# Patient Record
Sex: FEMALE | Race: WHITE | NOT HISPANIC OR LATINO | Employment: OTHER
[De-identification: names, ages, dates, MRNs, and addresses within clinical notes are randomized per-mention and may not be internally consistent; named-entity substitution may affect disease eponyms.]

---

## 2017-01-06 ENCOUNTER — RECORDS - HEALTHEAST (OUTPATIENT)
Dept: ADMINISTRATIVE | Facility: OTHER | Age: 62
End: 2017-01-06

## 2017-01-06 ENCOUNTER — RECORDS - HEALTHEAST (OUTPATIENT)
Dept: BONE DENSITY | Facility: CLINIC | Age: 62
End: 2017-01-06

## 2017-01-06 DIAGNOSIS — M81.0 AGE-RELATED OSTEOPOROSIS WITHOUT CURRENT PATHOLOGICAL FRACTURE: ICD-10-CM

## 2017-01-17 ENCOUNTER — COMMUNICATION - HEALTHEAST (OUTPATIENT)
Dept: INTERNAL MEDICINE | Facility: CLINIC | Age: 62
End: 2017-01-17

## 2017-02-09 ENCOUNTER — COMMUNICATION - HEALTHEAST (OUTPATIENT)
Dept: FAMILY MEDICINE | Facility: CLINIC | Age: 62
End: 2017-02-09

## 2017-02-09 ENCOUNTER — AMBULATORY - HEALTHEAST (OUTPATIENT)
Dept: NURSING | Facility: CLINIC | Age: 62
End: 2017-02-09

## 2017-02-09 DIAGNOSIS — E03.9 UNSPECIFIED HYPOTHYROIDISM: ICD-10-CM

## 2017-02-09 DIAGNOSIS — M81.0 OSTEOPOROSIS: ICD-10-CM

## 2017-03-16 ENCOUNTER — OFFICE VISIT - HEALTHEAST (OUTPATIENT)
Dept: FAMILY MEDICINE | Facility: CLINIC | Age: 62
End: 2017-03-16

## 2017-03-16 DIAGNOSIS — M81.0 OSTEOPOROSIS: ICD-10-CM

## 2017-03-16 DIAGNOSIS — E03.9 HYPOTHYROIDISM: ICD-10-CM

## 2017-03-16 DIAGNOSIS — E03.9 UNSPECIFIED HYPOTHYROIDISM: ICD-10-CM

## 2017-03-16 DIAGNOSIS — E78.5 HYPERLIPIDEMIA: ICD-10-CM

## 2017-03-16 LAB
CHOLEST SERPL-MCNC: 237 MG/DL
FASTING STATUS PATIENT QL REPORTED: YES
HDLC SERPL-MCNC: 68 MG/DL
LDLC SERPL CALC-MCNC: 145 MG/DL
TRIGL SERPL-MCNC: 122 MG/DL

## 2017-05-22 ENCOUNTER — COMMUNICATION - HEALTHEAST (OUTPATIENT)
Dept: FAMILY MEDICINE | Facility: CLINIC | Age: 62
End: 2017-05-22

## 2017-05-22 DIAGNOSIS — E03.9 UNSPECIFIED HYPOTHYROIDISM: ICD-10-CM

## 2017-08-18 ENCOUNTER — OFFICE VISIT - HEALTHEAST (OUTPATIENT)
Dept: INTERNAL MEDICINE | Facility: CLINIC | Age: 62
End: 2017-08-18

## 2017-08-18 DIAGNOSIS — E03.9 HYPOTHYROIDISM: ICD-10-CM

## 2017-08-18 DIAGNOSIS — M81.0 OSTEOPOROSIS: ICD-10-CM

## 2017-09-02 ENCOUNTER — COMMUNICATION - HEALTHEAST (OUTPATIENT)
Dept: FAMILY MEDICINE | Facility: CLINIC | Age: 62
End: 2017-09-02

## 2017-09-02 DIAGNOSIS — E03.9 UNSPECIFIED HYPOTHYROIDISM: ICD-10-CM

## 2017-10-04 ENCOUNTER — OFFICE VISIT - HEALTHEAST (OUTPATIENT)
Dept: FAMILY MEDICINE | Facility: CLINIC | Age: 62
End: 2017-10-04

## 2017-10-04 DIAGNOSIS — R42 VERTIGO: ICD-10-CM

## 2017-10-04 DIAGNOSIS — Z23 NEEDS FLU SHOT: ICD-10-CM

## 2017-10-04 DIAGNOSIS — L30.9 ECZEMA, UNSPECIFIED TYPE: ICD-10-CM

## 2017-10-04 DIAGNOSIS — R51.9 PERSISTENT HEADACHES: ICD-10-CM

## 2017-11-01 ENCOUNTER — COMMUNICATION - HEALTHEAST (OUTPATIENT)
Dept: FAMILY MEDICINE | Facility: CLINIC | Age: 62
End: 2017-11-01

## 2017-11-01 DIAGNOSIS — E78.5 HYPERLIPIDEMIA: ICD-10-CM

## 2018-01-23 ENCOUNTER — COMMUNICATION - HEALTHEAST (OUTPATIENT)
Dept: ADMINISTRATIVE | Facility: CLINIC | Age: 63
End: 2018-01-23

## 2018-03-15 ENCOUNTER — COMMUNICATION - HEALTHEAST (OUTPATIENT)
Dept: ADMINISTRATIVE | Facility: CLINIC | Age: 63
End: 2018-03-15

## 2018-06-07 ENCOUNTER — AMBULATORY - HEALTHEAST (OUTPATIENT)
Dept: NURSING | Facility: CLINIC | Age: 63
End: 2018-06-07

## 2018-07-13 ENCOUNTER — OFFICE VISIT - HEALTHEAST (OUTPATIENT)
Dept: FAMILY MEDICINE | Facility: CLINIC | Age: 63
End: 2018-07-13

## 2018-07-13 DIAGNOSIS — R51.9 FREQUENT HEADACHES: ICD-10-CM

## 2018-07-13 DIAGNOSIS — Z12.11 SCREEN FOR COLON CANCER: ICD-10-CM

## 2018-07-13 DIAGNOSIS — N95.1 MENOPAUSAL SYNDROME (HOT FLASHES): ICD-10-CM

## 2018-07-13 DIAGNOSIS — M81.0 OSTEOPOROSIS: ICD-10-CM

## 2018-07-13 DIAGNOSIS — K21.9 GERD (GASTROESOPHAGEAL REFLUX DISEASE): ICD-10-CM

## 2018-07-13 DIAGNOSIS — Z01.419 WELL WOMAN EXAM: ICD-10-CM

## 2018-07-13 DIAGNOSIS — E03.9 HYPOTHYROIDISM: ICD-10-CM

## 2018-07-13 DIAGNOSIS — E78.5 HYPERLIPIDEMIA: ICD-10-CM

## 2018-07-13 DIAGNOSIS — Z12.31 VISIT FOR SCREENING MAMMOGRAM: ICD-10-CM

## 2018-07-13 LAB
ALBUMIN SERPL-MCNC: 4.3 G/DL (ref 3.5–5)
ALP SERPL-CCNC: 41 U/L (ref 45–120)
ALT SERPL W P-5'-P-CCNC: 18 U/L (ref 0–45)
ANION GAP SERPL CALCULATED.3IONS-SCNC: 9 MMOL/L (ref 5–18)
AST SERPL W P-5'-P-CCNC: 24 U/L (ref 0–40)
ATRIAL RATE - MUSE: 69 BPM
BILIRUB SERPL-MCNC: 0.8 MG/DL (ref 0–1)
BUN SERPL-MCNC: 19 MG/DL (ref 8–22)
CALCIUM SERPL-MCNC: 9.4 MG/DL (ref 8.5–10.5)
CHLORIDE BLD-SCNC: 106 MMOL/L (ref 98–107)
CHOLEST SERPL-MCNC: 246 MG/DL
CO2 SERPL-SCNC: 26 MMOL/L (ref 22–31)
CREAT SERPL-MCNC: 0.8 MG/DL (ref 0.6–1.1)
DIASTOLIC BLOOD PRESSURE - MUSE: NORMAL MMHG
FASTING STATUS PATIENT QL REPORTED: YES
GFR SERPL CREATININE-BSD FRML MDRD: >60 ML/MIN/1.73M2
GLUCOSE BLD-MCNC: 96 MG/DL (ref 70–125)
HDLC SERPL-MCNC: 72 MG/DL
INTERPRETATION ECG - MUSE: NORMAL
LDLC SERPL CALC-MCNC: 158 MG/DL
P AXIS - MUSE: 68 DEGREES
POTASSIUM BLD-SCNC: 5 MMOL/L (ref 3.5–5)
PR INTERVAL - MUSE: 138 MS
PROT SERPL-MCNC: 7.4 G/DL (ref 6–8)
QRS DURATION - MUSE: 80 MS
QT - MUSE: 428 MS
QTC - MUSE: 458 MS
R AXIS - MUSE: 18 DEGREES
SODIUM SERPL-SCNC: 141 MMOL/L (ref 136–145)
SYSTOLIC BLOOD PRESSURE - MUSE: NORMAL MMHG
T AXIS - MUSE: -3 DEGREES
TRIGL SERPL-MCNC: 78 MG/DL
TSH SERPL DL<=0.005 MIU/L-ACNC: 0.38 UIU/ML (ref 0.3–5)
VENTRICULAR RATE- MUSE: 69 BPM

## 2018-07-13 ASSESSMENT — MIFFLIN-ST. JEOR: SCORE: 1137.14

## 2018-07-16 LAB
HPV SOURCE: NORMAL
HUMAN PAPILLOMA VIRUS 16 DNA: NEGATIVE
HUMAN PAPILLOMA VIRUS 18 DNA: NEGATIVE
HUMAN PAPILLOMA VIRUS FINAL DIAGNOSIS: NORMAL
HUMAN PAPILLOMA VIRUS OTHER HR: NEGATIVE
SPECIMEN DESCRIPTION: NORMAL

## 2018-07-17 ENCOUNTER — COMMUNICATION - HEALTHEAST (OUTPATIENT)
Dept: FAMILY MEDICINE | Facility: CLINIC | Age: 63
End: 2018-07-17

## 2018-07-23 LAB
BKR LAB AP ABNORMAL BLEEDING: NO
BKR LAB AP BIRTH CONTROL/HORMONES: NORMAL
BKR LAB AP CERVICAL APPEARANCE: NORMAL
BKR LAB AP GYN ADEQUACY: NORMAL
BKR LAB AP GYN INTERPRETATION: NORMAL
BKR LAB AP HPV REFLEX: NORMAL
BKR LAB AP LMP: 52
BKR LAB AP PATIENT STATUS: NORMAL
BKR LAB AP PREVIOUS ABNORMAL: NORMAL
BKR LAB AP PREVIOUS NORMAL: 2012
HIGH RISK?: YES
PATH REPORT.COMMENTS IMP SPEC: NORMAL
RESULT FLAG (HE HISTORICAL CONVERSION): NORMAL

## 2018-08-21 ENCOUNTER — COMMUNICATION - HEALTHEAST (OUTPATIENT)
Dept: FAMILY MEDICINE | Facility: CLINIC | Age: 63
End: 2018-08-21

## 2018-08-21 DIAGNOSIS — E03.9 HYPOTHYROIDISM: ICD-10-CM

## 2018-10-10 ENCOUNTER — COMMUNICATION - HEALTHEAST (OUTPATIENT)
Dept: FAMILY MEDICINE | Facility: CLINIC | Age: 63
End: 2018-10-10

## 2018-10-10 DIAGNOSIS — K21.9 GERD (GASTROESOPHAGEAL REFLUX DISEASE): ICD-10-CM

## 2019-03-21 ENCOUNTER — COMMUNICATION - HEALTHEAST (OUTPATIENT)
Dept: FAMILY MEDICINE | Facility: CLINIC | Age: 64
End: 2019-03-21

## 2019-03-21 DIAGNOSIS — E03.9 HYPOTHYROIDISM: ICD-10-CM

## 2019-05-21 ENCOUNTER — COMMUNICATION - HEALTHEAST (OUTPATIENT)
Dept: ADMINISTRATIVE | Facility: CLINIC | Age: 64
End: 2019-05-21

## 2019-05-21 DIAGNOSIS — M81.0 OSTEOPOROSIS: ICD-10-CM

## 2019-05-23 ENCOUNTER — AMBULATORY - HEALTHEAST (OUTPATIENT)
Dept: SCHEDULING | Facility: CLINIC | Age: 64
End: 2019-05-23

## 2019-05-23 DIAGNOSIS — M81.0 OSTEOPOROSIS: ICD-10-CM

## 2019-06-06 ENCOUNTER — OFFICE VISIT - HEALTHEAST (OUTPATIENT)
Dept: INTERNAL MEDICINE | Facility: CLINIC | Age: 64
End: 2019-06-06

## 2019-06-06 DIAGNOSIS — M81.0 AGE RELATED OSTEOPOROSIS, UNSPECIFIED PATHOLOGICAL FRACTURE PRESENCE: ICD-10-CM

## 2019-06-11 ENCOUNTER — COMMUNICATION - HEALTHEAST (OUTPATIENT)
Dept: SCHEDULING | Facility: CLINIC | Age: 64
End: 2019-06-11

## 2019-06-12 ENCOUNTER — OFFICE VISIT - HEALTHEAST (OUTPATIENT)
Dept: FAMILY MEDICINE | Facility: CLINIC | Age: 64
End: 2019-06-12

## 2019-06-12 DIAGNOSIS — M79.2 NEUROPATHIC PAIN: ICD-10-CM

## 2019-06-12 DIAGNOSIS — R30.0 DYSURIA: ICD-10-CM

## 2019-06-12 DIAGNOSIS — M21.611 BUNION, RIGHT: ICD-10-CM

## 2019-06-12 DIAGNOSIS — R25.2 LEG CRAMPING: ICD-10-CM

## 2019-06-12 LAB
ALBUMIN UR-MCNC: NEGATIVE MG/DL
ANION GAP SERPL CALCULATED.3IONS-SCNC: 8 MMOL/L (ref 5–18)
APPEARANCE UR: CLEAR
BACTERIA #/AREA URNS HPF: ABNORMAL HPF
BILIRUB UR QL STRIP: NEGATIVE
BUN SERPL-MCNC: 18 MG/DL (ref 8–22)
CALCIUM SERPL-MCNC: 9.7 MG/DL (ref 8.5–10.5)
CHLORIDE BLD-SCNC: 103 MMOL/L (ref 98–107)
CO2 SERPL-SCNC: 27 MMOL/L (ref 22–31)
COLOR UR AUTO: YELLOW
CREAT SERPL-MCNC: 0.87 MG/DL (ref 0.6–1.1)
GFR SERPL CREATININE-BSD FRML MDRD: >60 ML/MIN/1.73M2
GLUCOSE BLD-MCNC: 97 MG/DL (ref 70–125)
GLUCOSE UR STRIP-MCNC: NEGATIVE MG/DL
HBA1C MFR BLD: 5.7 % (ref 3.5–6)
HGB UR QL STRIP: ABNORMAL
KETONES UR STRIP-MCNC: NEGATIVE MG/DL
LEUKOCYTE ESTERASE UR QL STRIP: NEGATIVE
MAGNESIUM SERPL-MCNC: 2.3 MG/DL (ref 1.8–2.6)
NITRATE UR QL: NEGATIVE
PH UR STRIP: 6.5 [PH] (ref 5–8)
POTASSIUM BLD-SCNC: 5.2 MMOL/L (ref 3.5–5)
RBC #/AREA URNS AUTO: ABNORMAL HPF
SODIUM SERPL-SCNC: 138 MMOL/L (ref 136–145)
SP GR UR STRIP: 1.01 (ref 1–1.03)
SQUAMOUS #/AREA URNS AUTO: ABNORMAL LPF
UROBILINOGEN UR STRIP-ACNC: ABNORMAL
WBC #/AREA URNS AUTO: ABNORMAL HPF

## 2019-06-12 ASSESSMENT — MIFFLIN-ST. JEOR: SCORE: 1127.62

## 2019-06-13 ENCOUNTER — COMMUNICATION - HEALTHEAST (OUTPATIENT)
Dept: FAMILY MEDICINE | Facility: CLINIC | Age: 64
End: 2019-06-13

## 2019-07-01 ENCOUNTER — COMMUNICATION - HEALTHEAST (OUTPATIENT)
Dept: FAMILY MEDICINE | Facility: CLINIC | Age: 64
End: 2019-07-01

## 2019-07-01 DIAGNOSIS — E03.9 HYPOTHYROIDISM: ICD-10-CM

## 2019-07-26 ENCOUNTER — HOSPITAL ENCOUNTER (OUTPATIENT)
Dept: MAMMOGRAPHY | Facility: CLINIC | Age: 64
Discharge: HOME | End: 2019-07-26
Attending: FAMILY MEDICINE

## 2019-07-26 DIAGNOSIS — Z12.31 VISIT FOR SCREENING MAMMOGRAM: ICD-10-CM

## 2019-07-29 ENCOUNTER — OFFICE VISIT - HEALTHEAST (OUTPATIENT)
Dept: FAMILY MEDICINE | Facility: CLINIC | Age: 64
End: 2019-07-29

## 2019-07-29 DIAGNOSIS — E03.9 HYPOTHYROIDISM, UNSPECIFIED TYPE: ICD-10-CM

## 2019-07-29 DIAGNOSIS — Z11.59 ENCOUNTER FOR HEPATITIS C SCREENING TEST FOR LOW RISK PATIENT: ICD-10-CM

## 2019-07-29 DIAGNOSIS — Z00.01 ENCOUNTER FOR GENERAL ADULT MEDICAL EXAMINATION WITH ABNORMAL FINDINGS: ICD-10-CM

## 2019-07-29 DIAGNOSIS — Z12.11 SPECIAL SCREENING FOR MALIGNANT NEOPLASMS, COLON: ICD-10-CM

## 2019-07-29 DIAGNOSIS — R20.2 PARESTHESIAS: ICD-10-CM

## 2019-07-29 DIAGNOSIS — L30.9 ECZEMA, UNSPECIFIED TYPE: ICD-10-CM

## 2019-07-29 DIAGNOSIS — K21.9 GASTROESOPHAGEAL REFLUX DISEASE WITHOUT ESOPHAGITIS: ICD-10-CM

## 2019-07-29 DIAGNOSIS — E03.9 HYPOTHYROIDISM: ICD-10-CM

## 2019-07-29 DIAGNOSIS — E78.5 HYPERLIPIDEMIA, UNSPECIFIED HYPERLIPIDEMIA TYPE: ICD-10-CM

## 2019-07-29 DIAGNOSIS — M81.0 OSTEOPOROSIS WITHOUT CURRENT PATHOLOGICAL FRACTURE, UNSPECIFIED OSTEOPOROSIS TYPE: ICD-10-CM

## 2019-07-29 LAB
CHOLEST SERPL-MCNC: 257 MG/DL
ERYTHROCYTE [DISTWIDTH] IN BLOOD BY AUTOMATED COUNT: 11.6 % (ref 11–14.5)
FASTING STATUS PATIENT QL REPORTED: ABNORMAL
FERRITIN SERPL-MCNC: 176 NG/ML (ref 10–130)
HCT VFR BLD AUTO: 37.6 % (ref 35–47)
HDLC SERPL-MCNC: 76 MG/DL
HGB BLD-MCNC: 12.7 G/DL (ref 12–16)
LDLC SERPL CALC-MCNC: 166 MG/DL
MCH RBC QN AUTO: 30.8 PG (ref 27–34)
MCHC RBC AUTO-ENTMCNC: 33.8 G/DL (ref 32–36)
MCV RBC AUTO: 91 FL (ref 80–100)
PLATELET # BLD AUTO: 296 THOU/UL (ref 140–440)
PMV BLD AUTO: 7.7 FL (ref 7–10)
RBC # BLD AUTO: 4.13 MILL/UL (ref 3.8–5.4)
T4 FREE SERPL-MCNC: 1.4 NG/DL (ref 0.7–1.8)
TRIGL SERPL-MCNC: 73 MG/DL
TSH SERPL DL<=0.005 MIU/L-ACNC: 0.22 UIU/ML (ref 0.3–5)
VIT B12 SERPL-MCNC: 502 PG/ML (ref 213–816)
WBC: 5.7 THOU/UL (ref 4–11)

## 2019-07-29 ASSESSMENT — MIFFLIN-ST. JEOR: SCORE: 1122.4

## 2019-07-30 LAB
25(OH)D3 SERPL-MCNC: 31 NG/ML (ref 30–80)
25(OH)D3 SERPL-MCNC: 31 NG/ML (ref 30–80)
HCV AB SERPL QL IA: NEGATIVE

## 2019-08-01 LAB — MAGNESIUM SERPL-MCNC: 2 MG/DL (ref 1.8–2.6)

## 2019-10-08 ENCOUNTER — OFFICE VISIT - HEALTHEAST (OUTPATIENT)
Dept: FAMILY MEDICINE | Facility: CLINIC | Age: 64
End: 2019-10-08

## 2019-10-08 DIAGNOSIS — J01.10 ACUTE NON-RECURRENT FRONTAL SINUSITIS: ICD-10-CM

## 2019-12-09 ENCOUNTER — AMBULATORY - HEALTHEAST (OUTPATIENT)
Dept: INTERNAL MEDICINE | Facility: CLINIC | Age: 64
End: 2019-12-09

## 2019-12-09 DIAGNOSIS — M81.0 AGE RELATED OSTEOPOROSIS, UNSPECIFIED PATHOLOGICAL FRACTURE PRESENCE: ICD-10-CM

## 2019-12-11 ENCOUNTER — AMBULATORY - HEALTHEAST (OUTPATIENT)
Dept: NURSING | Facility: CLINIC | Age: 64
End: 2019-12-11

## 2020-02-18 ENCOUNTER — COMMUNICATION - HEALTHEAST (OUTPATIENT)
Dept: FAMILY MEDICINE | Facility: CLINIC | Age: 65
End: 2020-02-18

## 2020-06-16 ENCOUNTER — OFFICE VISIT - HEALTHEAST (OUTPATIENT)
Dept: INTERNAL MEDICINE | Facility: CLINIC | Age: 65
End: 2020-06-16

## 2020-06-16 DIAGNOSIS — M81.0 OSTEOPOROSIS WITHOUT CURRENT PATHOLOGICAL FRACTURE, UNSPECIFIED OSTEOPOROSIS TYPE: ICD-10-CM

## 2020-09-22 ENCOUNTER — COMMUNICATION - HEALTHEAST (OUTPATIENT)
Dept: FAMILY MEDICINE | Facility: CLINIC | Age: 65
End: 2020-09-22

## 2020-09-22 DIAGNOSIS — E03.9 HYPOTHYROIDISM: ICD-10-CM

## 2020-10-07 ENCOUNTER — COMMUNICATION - HEALTHEAST (OUTPATIENT)
Dept: FAMILY MEDICINE | Facility: CLINIC | Age: 65
End: 2020-10-07

## 2020-10-07 DIAGNOSIS — M81.0 OSTEOPOROSIS WITHOUT CURRENT PATHOLOGICAL FRACTURE, UNSPECIFIED OSTEOPOROSIS TYPE: ICD-10-CM

## 2020-10-07 DIAGNOSIS — Z00.01 ENCOUNTER FOR GENERAL ADULT MEDICAL EXAMINATION WITH ABNORMAL FINDINGS: ICD-10-CM

## 2020-10-07 DIAGNOSIS — E78.5 HYPERLIPIDEMIA, UNSPECIFIED HYPERLIPIDEMIA TYPE: ICD-10-CM

## 2020-10-07 DIAGNOSIS — E03.9 HYPOTHYROIDISM, UNSPECIFIED TYPE: ICD-10-CM

## 2020-10-08 ENCOUNTER — COMMUNICATION - HEALTHEAST (OUTPATIENT)
Dept: FAMILY MEDICINE | Facility: CLINIC | Age: 65
End: 2020-10-08

## 2020-10-12 ENCOUNTER — AMBULATORY - HEALTHEAST (OUTPATIENT)
Dept: LAB | Facility: CLINIC | Age: 65
End: 2020-10-12

## 2020-10-12 DIAGNOSIS — M81.0 OSTEOPOROSIS WITHOUT CURRENT PATHOLOGICAL FRACTURE, UNSPECIFIED OSTEOPOROSIS TYPE: ICD-10-CM

## 2020-10-12 DIAGNOSIS — Z00.01 ENCOUNTER FOR GENERAL ADULT MEDICAL EXAMINATION WITH ABNORMAL FINDINGS: ICD-10-CM

## 2020-10-12 DIAGNOSIS — E78.5 HYPERLIPIDEMIA, UNSPECIFIED HYPERLIPIDEMIA TYPE: ICD-10-CM

## 2020-10-12 DIAGNOSIS — E03.9 HYPOTHYROIDISM, UNSPECIFIED TYPE: ICD-10-CM

## 2020-10-12 LAB
ALBUMIN SERPL-MCNC: 4.3 G/DL (ref 3.5–5)
ALP SERPL-CCNC: 34 U/L (ref 45–120)
ALT SERPL W P-5'-P-CCNC: 16 U/L (ref 0–45)
ANION GAP SERPL CALCULATED.3IONS-SCNC: 10 MMOL/L (ref 5–18)
AST SERPL W P-5'-P-CCNC: 21 U/L (ref 0–40)
BILIRUB SERPL-MCNC: 0.7 MG/DL (ref 0–1)
BUN SERPL-MCNC: 21 MG/DL (ref 8–22)
CALCIUM SERPL-MCNC: 9.8 MG/DL (ref 8.5–10.5)
CHLORIDE BLD-SCNC: 102 MMOL/L (ref 98–107)
CHOLEST SERPL-MCNC: 297 MG/DL
CO2 SERPL-SCNC: 26 MMOL/L (ref 22–31)
CREAT SERPL-MCNC: 0.92 MG/DL (ref 0.6–1.1)
FASTING STATUS PATIENT QL REPORTED: YES
GFR SERPL CREATININE-BSD FRML MDRD: >60 ML/MIN/1.73M2
GLUCOSE BLD-MCNC: 95 MG/DL (ref 70–125)
HDLC SERPL-MCNC: 75 MG/DL
LDLC SERPL CALC-MCNC: 199 MG/DL
POTASSIUM BLD-SCNC: 4.2 MMOL/L (ref 3.5–5)
PROT SERPL-MCNC: 7.5 G/DL (ref 6–8)
SODIUM SERPL-SCNC: 138 MMOL/L (ref 136–145)
TRIGL SERPL-MCNC: 113 MG/DL
TSH SERPL DL<=0.005 MIU/L-ACNC: 1.58 UIU/ML (ref 0.3–5)

## 2020-10-13 LAB
25(OH)D3 SERPL-MCNC: 25.7 NG/ML (ref 30–80)
25(OH)D3 SERPL-MCNC: 25.7 NG/ML (ref 30–80)

## 2020-10-15 ENCOUNTER — COMMUNICATION - HEALTHEAST (OUTPATIENT)
Dept: FAMILY MEDICINE | Facility: CLINIC | Age: 65
End: 2020-10-15

## 2020-10-15 DIAGNOSIS — E78.5 HYPERLIPIDEMIA, UNSPECIFIED HYPERLIPIDEMIA TYPE: ICD-10-CM

## 2020-11-04 ENCOUNTER — RECORDS - HEALTHEAST (OUTPATIENT)
Dept: ADMINISTRATIVE | Facility: OTHER | Age: 65
End: 2020-11-04

## 2020-11-30 ENCOUNTER — OFFICE VISIT - HEALTHEAST (OUTPATIENT)
Dept: FAMILY MEDICINE | Facility: CLINIC | Age: 65
End: 2020-11-30

## 2020-11-30 DIAGNOSIS — M81.0 OSTEOPOROSIS WITHOUT CURRENT PATHOLOGICAL FRACTURE, UNSPECIFIED OSTEOPOROSIS TYPE: ICD-10-CM

## 2020-11-30 DIAGNOSIS — Z12.31 ENCOUNTER FOR SCREENING MAMMOGRAM FOR BREAST CANCER: ICD-10-CM

## 2020-11-30 DIAGNOSIS — E78.01 FAMILIAL HYPERCHOLESTEROLEMIA: ICD-10-CM

## 2020-11-30 DIAGNOSIS — Z00.00 WELCOME TO MEDICARE PREVENTIVE VISIT: ICD-10-CM

## 2020-11-30 DIAGNOSIS — E03.9 HYPOTHYROIDISM, UNSPECIFIED TYPE: ICD-10-CM

## 2020-11-30 DIAGNOSIS — G58.8 OTHER MONONEUROPATHY: ICD-10-CM

## 2020-11-30 DIAGNOSIS — B07.9 VIRAL WARTS, UNSPECIFIED TYPE: ICD-10-CM

## 2020-11-30 LAB
IRON SATN MFR SERPL: 23 % (ref 20–50)
IRON SERPL-MCNC: 68 UG/DL (ref 42–175)
TIBC SERPL-MCNC: 295 UG/DL (ref 313–563)
TRANSFERRIN SERPL-MCNC: 236 MG/DL (ref 212–360)
VIT B12 SERPL-MCNC: 602 PG/ML (ref 213–816)

## 2020-11-30 ASSESSMENT — MIFFLIN-ST. JEOR: SCORE: 1139.41

## 2020-12-04 ENCOUNTER — AMBULATORY - HEALTHEAST (OUTPATIENT)
Dept: INTERNAL MEDICINE | Facility: CLINIC | Age: 65
End: 2020-12-04

## 2020-12-04 DIAGNOSIS — M81.0 OSTEOPOROSIS WITHOUT CURRENT PATHOLOGICAL FRACTURE, UNSPECIFIED OSTEOPOROSIS TYPE: ICD-10-CM

## 2020-12-04 DIAGNOSIS — E03.9 HYPOTHYROIDISM, UNSPECIFIED TYPE: ICD-10-CM

## 2020-12-17 ENCOUNTER — AMBULATORY - HEALTHEAST (OUTPATIENT)
Dept: NURSING | Facility: CLINIC | Age: 65
End: 2020-12-17

## 2020-12-17 ENCOUNTER — COMMUNICATION - HEALTHEAST (OUTPATIENT)
Dept: INTERNAL MEDICINE | Facility: CLINIC | Age: 65
End: 2020-12-17

## 2020-12-17 DIAGNOSIS — M81.0 OSTEOPOROSIS WITHOUT CURRENT PATHOLOGICAL FRACTURE, UNSPECIFIED OSTEOPOROSIS TYPE: ICD-10-CM

## 2020-12-18 ENCOUNTER — COMMUNICATION - HEALTHEAST (OUTPATIENT)
Dept: FAMILY MEDICINE | Facility: CLINIC | Age: 65
End: 2020-12-18

## 2020-12-18 DIAGNOSIS — E03.9 HYPOTHYROIDISM: ICD-10-CM

## 2020-12-21 RX ORDER — LEVOTHYROXINE SODIUM 75 UG/1
75 TABLET ORAL DAILY
Qty: 90 TABLET | Refills: 3 | Status: SHIPPED | OUTPATIENT
Start: 2020-12-21 | End: 2021-12-23

## 2021-02-24 ENCOUNTER — COMMUNICATION - HEALTHEAST (OUTPATIENT)
Dept: FAMILY MEDICINE | Facility: CLINIC | Age: 66
End: 2021-02-24

## 2021-02-24 DIAGNOSIS — L30.9 ECZEMA, UNSPECIFIED TYPE: ICD-10-CM

## 2021-02-24 RX ORDER — CLOBETASOL PROPIONATE 0.5 MG/G
1 CREAM TOPICAL 2 TIMES DAILY
Qty: 30 G | Refills: 1 | Status: SHIPPED | OUTPATIENT
Start: 2021-02-24 | End: 2023-06-27

## 2021-03-19 ENCOUNTER — AMBULATORY - HEALTHEAST (OUTPATIENT)
Dept: SCHEDULING | Facility: CLINIC | Age: 66
End: 2021-03-19

## 2021-03-19 DIAGNOSIS — M81.0 OSTEOPOROSIS WITHOUT CURRENT PATHOLOGICAL FRACTURE, UNSPECIFIED OSTEOPOROSIS TYPE: ICD-10-CM

## 2021-04-24 ENCOUNTER — COMMUNICATION - HEALTHEAST (OUTPATIENT)
Dept: FAMILY MEDICINE | Facility: CLINIC | Age: 66
End: 2021-04-24

## 2021-05-11 ENCOUNTER — OFFICE VISIT - HEALTHEAST (OUTPATIENT)
Dept: FAMILY MEDICINE | Facility: CLINIC | Age: 66
End: 2021-05-11

## 2021-05-11 DIAGNOSIS — R82.90 FOUL SMELLING URINE: ICD-10-CM

## 2021-05-11 DIAGNOSIS — B07.9 VIRAL WARTS, UNSPECIFIED TYPE: ICD-10-CM

## 2021-05-11 DIAGNOSIS — M81.0 OSTEOPOROSIS WITHOUT CURRENT PATHOLOGICAL FRACTURE, UNSPECIFIED OSTEOPOROSIS TYPE: ICD-10-CM

## 2021-05-11 DIAGNOSIS — R21 RASH AND NONSPECIFIC SKIN ERUPTION: ICD-10-CM

## 2021-05-11 DIAGNOSIS — E78.01 FAMILIAL HYPERCHOLESTEROLEMIA: ICD-10-CM

## 2021-05-11 DIAGNOSIS — E78.5 HYPERLIPIDEMIA, UNSPECIFIED HYPERLIPIDEMIA TYPE: ICD-10-CM

## 2021-05-11 DIAGNOSIS — G25.81 RESTLESS LEG SYNDROME: ICD-10-CM

## 2021-05-11 LAB
ALBUMIN SERPL-MCNC: 4.2 G/DL (ref 3.5–5)
ALBUMIN UR-MCNC: NEGATIVE G/DL
ALP SERPL-CCNC: 33 U/L (ref 45–120)
ALT SERPL W P-5'-P-CCNC: 20 U/L (ref 0–45)
APPEARANCE UR: CLEAR
AST SERPL W P-5'-P-CCNC: 25 U/L (ref 0–40)
BACTERIA #/AREA URNS HPF: ABNORMAL /[HPF]
BILIRUB DIRECT SERPL-MCNC: 0.3 MG/DL
BILIRUB SERPL-MCNC: 0.7 MG/DL (ref 0–1)
BILIRUB UR QL STRIP: NEGATIVE
CHOLEST SERPL-MCNC: 211 MG/DL
COLOR UR AUTO: YELLOW
FASTING STATUS PATIENT QL REPORTED: YES
GLUCOSE UR STRIP-MCNC: NEGATIVE MG/DL
HDLC SERPL-MCNC: 73 MG/DL
HGB UR QL STRIP: ABNORMAL
KETONES UR STRIP-MCNC: NEGATIVE MG/DL
LDLC SERPL CALC-MCNC: 121 MG/DL
LEUKOCYTE ESTERASE UR QL STRIP: NEGATIVE
NITRATE UR QL: NEGATIVE
PH UR STRIP: 5 [PH] (ref 5–8)
PROT SERPL-MCNC: 7.4 G/DL (ref 6–8)
RBC #/AREA URNS AUTO: ABNORMAL HPF
SP GR UR STRIP: 1.02 (ref 1–1.03)
SQUAMOUS #/AREA URNS AUTO: ABNORMAL LPF
TRIGL SERPL-MCNC: 87 MG/DL
UROBILINOGEN UR STRIP-ACNC: ABNORMAL
WBC #/AREA URNS AUTO: ABNORMAL HPF

## 2021-05-12 RX ORDER — ROSUVASTATIN CALCIUM 5 MG/1
5 TABLET, COATED ORAL AT BEDTIME
Qty: 90 TABLET | Refills: 4 | Status: SHIPPED | OUTPATIENT
Start: 2021-05-12 | End: 2022-06-05

## 2021-05-25 ENCOUNTER — RECORDS - HEALTHEAST (OUTPATIENT)
Dept: ADMINISTRATIVE | Facility: CLINIC | Age: 66
End: 2021-05-25

## 2021-05-26 NOTE — TELEPHONE ENCOUNTER
Refill Approved    Rx renewed per Medication Renewal Policy. Medication was last renewed on 8/21/18.    Ov: 7/13/18    Rhoda Hebert, [facility] Triage/Med Refill 3/23/2019     Requested Prescriptions   Pending Prescriptions Disp Refills     levothyroxine (SYNTHROID, LEVOTHROID) 75 MCG tablet [Pharmacy Med Name: LEVOTHYROXINE 0.075MG (75MCG) TABS] 90 tablet 0     Sig: TAKE 1 TABLET BY MOUTH DAILY    Thyroid Hormones Protocol Passed - 3/21/2019  3:27 PM       Passed - Provider visit in past 12 months or next 3 months    Last office visit with prescriber/PCP: 3/16/2017 Leona He DO OR same dept: Visit date not found OR same specialty: 10/4/2017 Geeta Ashley MD  Last physical: 7/13/2018 Last MTM visit: Visit date not found   Next visit within 3 mo: Visit date not found  Next physical within 3 mo: Visit date not found  Prescriber OR PCP: Leona He DO  Last diagnosis associated with med order: 1. Hypothyroidism  - levothyroxine (SYNTHROID, LEVOTHROID) 75 MCG tablet [Pharmacy Med Name: LEVOTHYROXINE 0.075MG (75MCG) TABS]; TAKE 1 TABLET BY MOUTH DAILY  Dispense: 90 tablet; Refill: 0    If protocol passes may refill for 12 months if within 3 months of last provider visit (or a total of 15 months).            Passed - TSH on file in past 12 months for patient age 12 & older    TSH   Date Value Ref Range Status   07/13/2018 0.38 0.30 - 5.00 uIU/mL Final

## 2021-05-27 ENCOUNTER — RECORDS - HEALTHEAST (OUTPATIENT)
Dept: ADMINISTRATIVE | Facility: CLINIC | Age: 66
End: 2021-05-27

## 2021-05-27 VITALS
OXYGEN SATURATION: 97 % | WEIGHT: 129.4 LBS | DIASTOLIC BLOOD PRESSURE: 71 MMHG | SYSTOLIC BLOOD PRESSURE: 112 MMHG | RESPIRATION RATE: 20 BRPM | TEMPERATURE: 98.3 F | HEART RATE: 76 BPM

## 2021-05-29 NOTE — TELEPHONE ENCOUNTER
Spoke to patient regarding lab results. Recommend avoiding or eliminating refined carbs, sugars, foods with high fructose corn syrup. Recheck Hgb A1c in 6-12 months.     Can place a referral for neurology as needed for neuropathic pain in legs if no improvement.

## 2021-05-29 NOTE — TELEPHONE ENCOUNTER
Patient calling. She is asking for an appointment for UTI.  She reports her urine is cloudy, and it has a very strong smell.  She also admits that she sometimes has a burning sensation.  She denies fever, urgency and frequency.  She reports , she rarely has bladder infection.    An appointment was made for tomorrow at   10:40 am. At the [facility].    Kathleen Holman RN  [facility] Triage/refill nurse    Reason for Disposition    Age > 50 years    Protocols used: URINATION PAIN - FEMALE-A-AH

## 2021-05-29 NOTE — PROGRESS NOTES
1. Age related osteoporosis, unspecified pathological fracture presence       Patient was educated on safety of Prolia utilizing Patient Counseling Chart for Healthcare Providers, as outlined by the Prolia REMS progam.     Return in about 6 months (around 12/6/2019) for Recheck, osteoporosis, Prolia injection.    Patient Instructions   Prolia 6th today.  Prolia 7th in 6 months with my nurse. I will see you in 1 year.    DXA  - in 2 years .   Phone number to schedule [phone].    Daily calcium need is 0890-5891 mg a day from the diet and supplements.  Calcium citrate is easier to digest.  Vitamin D 2000 IU daily recommended.      Chief Complaint   Patient presents with     Osteoporosis Follow Up     Osteo F/U       /65   Pulse 78   Wt 130 lb 8 oz (59.2 kg)   SpO2 98%   BMI 22.05 kg/m        Did you experience any problems with previous Prolia injection? no  Any medication change in the last 6 months? no  Did you take prednisone or other immunosupressant drugs in the last 6 months   (chemo, transplant, rheum, dermatology conditions)? no  Did you have any serious infection in the last 6 months?no  Any recent hospitalizations?no  Do you plan any dental work in the next 2-3 months?no  How much calcium do you take daily from the diet and supplements?1200 mg  How much vit D do you take daily? 2000 IU  Last DXA? 6/2019, Reviewed and discussed      Patient is here today for the 6th Prolia injection. Patient tolerated previous injections well, but for the last 2 years was getting only one dose a year. That is the explanation for the decline in the spine bone density.  We discussed calcium and vit D daily needs today.   Next Prolia injection will be in 6 months.     15 minutes spent with the patient and more then 50 % of the time in counseling.  This note has been dictated using voice recognition software. Any grammatical or context distortions are unintentional and inherent to the software      Patient Active  Problem List   Diagnosis     Hypothyroidism     Osteoporosis     Hyperlipidemia     GERD (gastroesophageal reflux disease)     Frequent headaches       Current Outpatient Medications on File Prior to Visit   Medication Sig Dispense Refill     cholecalciferol, vitamin D3, 5,000 unit Tab Take 1 tablet by mouth daily.       levothyroxine (SYNTHROID, LEVOTHROID) 75 MCG tablet TAKE 1 TABLET BY MOUTH DAILY 90 tablet 0     multivitamin with minerals (THERA-M) 9 mg iron-400 mcg Tab tablet Take 1 tablet by mouth daily.       omeprazole (PRILOSEC) 20 MG capsule TAKE 1 CAPSULE(20 MG) BY MOUTH DAILY BEFORE BREAKFAST 90 capsule 3     simvastatin (ZOCOR) 20 MG tablet Take 1 tablet (20 mg total) by mouth every evening. 90 tablet 4     clobetasol (TEMOVATE) 0.05 % cream Apply 1 application topically 2 (two) times a day. 30 g 1     Current Facility-Administered Medications on File Prior to Visit   Medication Dose Route Frequency Provider Last Rate Last Dose     denosumab 60 mg (PROLIA 60 mg/ml)  60 mg Subcutaneous Q6 Months Adeline Mahoney MD   60 mg at 06/07/18 2199

## 2021-05-29 NOTE — PATIENT INSTRUCTIONS - HE
Prolia 6th today.  Prolia 7th in 6 months with my nurse. I will see you in 1 year.    DXA  - in 2 years .   Phone number to schedule [phone].    Daily calcium need is 8549-6528 mg a day from the diet and supplements.  Calcium citrate is easier to digest.  Vitamin D 2000 IU daily recommended.

## 2021-05-29 NOTE — PROGRESS NOTES
Assessment/Plan:  1. Dysuria  - Urinalysis-UC if Indicated  -viral in nature vs bacterial UTI    2. Leg cramping  - Basic Metabolic Panel  - Magnesium    3. Bunion, right  - Ambulatory referral to Podiatry    4. Neuropathic pain  - Glycosylated Hemoglobin A1C  -consider neurology referral if no other causes were found    Follow up in 2 weeks with PCP for further evaluation.       Subjective:  Lynette Tai is a 63 y.o. year old female who has symptoms of urinary dysuria, flank pain bilaterally and foul smelling urine, incontinence for 1 month.  Denies fevers, chills, body aches.  Home treatment of symptoms includes drinking lots of water.     Patient has many other complaints including restless legs at nighttime, cramps, neuropathic sensation on bilateral lateral lower legs, bilateral bunions. Patient states she has recently taken sugar out of her diet. Concerns for family history of diabetes.     ROS  General: Denies fevers, chills, body aches, unintentional weight loss  Abdominal:Denies nausea, vomiting, diarrhea, constipation, abdominal pain  Back: Denies pain.  Extremities: has a burning sensation in bilateral lateral lower legs that is intermittent, restless legs, bilateral bunions  Social History     Tobacco Use   Smoking Status Never Smoker   Smokeless Tobacco Never Used     Patient Active Problem List   Diagnosis     Hypothyroidism     Osteoporosis     Hyperlipidemia     GERD (gastroesophageal reflux disease)     Frequent headaches     Past Medical History:   Diagnosis Date     Intermittent asthma      Intermittent asthma      Osteoporosis     a     Current Outpatient Medications on File Prior to Visit   Medication Sig Dispense Refill     cholecalciferol, vitamin D3, 5,000 unit Tab Take 1 tablet by mouth daily.       levothyroxine (SYNTHROID, LEVOTHROID) 75 MCG tablet TAKE 1 TABLET BY MOUTH DAILY 90 tablet 0     multivitamin with minerals (THERA-M) 9 mg iron-400 mcg Tab tablet Take 1 tablet by mouth  "daily.       omeprazole (PRILOSEC) 20 MG capsule TAKE 1 CAPSULE(20 MG) BY MOUTH DAILY BEFORE BREAKFAST 90 capsule 3     simvastatin (ZOCOR) 20 MG tablet Take 1 tablet (20 mg total) by mouth every evening. 90 tablet 4     clobetasol (TEMOVATE) 0.05 % cream Apply 1 application topically 2 (two) times a day. 30 g 1     Current Facility-Administered Medications on File Prior to Visit   Medication Dose Route Frequency Provider Last Rate Last Dose     denosumab 60 mg (PROLIA 60 mg/ml)  60 mg Subcutaneous Q6 Months Adeline Mahoney MD   60 mg at 06/06/19 1418     Allergies   Allergen Reactions     Codeine      Nausea      Hydrocodone-Acetaminophen      nausesa      Other Environmental Allergy      Sulfa (Sulfonamide Antibiotics)      Itchy eyes        Objective: /72 (Patient Site: Left Arm, Patient Position: Sitting, Cuff Size: Adult Regular)   Pulse 85   Ht 5' 4.5\" (1.638 m)   Wt 130 lb (59 kg)   SpO2 96%   BMI 21.97 kg/m    General: Patient appears to be in no acute distress.  Ears: No nodules, lesions, masses, discharge or tenderness in auricles or mastoid area. No cerumen in ear canals. Tympanic membranes pearly gray, normal bony landmarks and cone of light bilaterally, no bulges or perforations.   Oropharynx: Buccal mucosa pink, moist, no lesions. Tongue midline, spongy, pink. Uvula midline. Pharynx with out erythema, no exudates.  Lungs: Unlabored. clear breath sounds heard throughout lung fields.   Heart: Regular rate and rhythm.  Abdomen: Soft rounded abdomen, no surgical scars. Bowel sounds heard in all four quadrants; liver, spleen, and kidney not palpable, no tenderness on palpation of abdomen, no CVA tenderness.    Extremities: normal strength in bilateral lower extremities.    Susannah Phillips, APRN, CNP      "

## 2021-05-30 VITALS — BODY MASS INDEX: 21.8 KG/M2 | WEIGHT: 128 LBS

## 2021-05-30 NOTE — PROGRESS NOTES
FEMALE ADULT PREVENTIVE EXAM    ASSESSMENT & PLAN  Lynette was seen today for annual exam, pruritus, osteoporosis and leg problem.    Diagnoses and all orders for this visit:    Encounter for general adult medical examination with abnormal findings-health maintenance updated.  See below for instructions on follow-up    Osteoporosis without current pathological fracture, unspecified osteoporosis type-patient is going to continue doing her Prolia injections at the [facility] and we discussed follow-up with provider in 1 year.  She will go every 6 months and if she is considering a referral elsewhere she will let me know  -     Vitamin D, Total (25-Hydroxy)    Paresthesias-we will check the following labs to ensure no iron deficiency anemia or B12 deficiency or magnesium deficiency causing symptoms.  I get the suspicion that it could be secondary to her simvastatin which we went up on last year.  I am okay with her considering a trial off the medication if she chooses to see if symptoms resolve.  Could also consider gabapentin.  Uncertain the symptoms are a restless leg type phenomena  -     HM2(CBC w/o Differential)  -     Ferritin  -     Vitamin B12  -     Magnesium    Hypothyroidism, unspecified type-no symptoms of over or underactive thyroid function.  Continue current medication regimen as patient is just below threshold for TSH  -     Thyroid Cascade  -     T4, Free    Hyperlipidemia, unspecified hyperlipidemia type-discussed with patient holding her simvastatin to see if paresthesias resolve.  If they do or do not she can change to rosuvastatin given elevated LDL levels in 1 month or so and continue with better tolerated and repeat labs next year.  Otherwise she is low risk other than family history and we discussed she could consider staying off statin therapy if she chose as well for primary prevention  -     Lipid Profile  -     rosuvastatin (CRESTOR) 10 MG tablet; Take 1 tablet (10 mg total) by  "mouth at bedtime. For cholesterol    Gastroesophageal reflux disease without esophagitis-stable and only taking omeprazole as needed    Encounter for hepatitis C screening test for low risk patient  -     Hepatitis C Antibody (Anti-HCV)    Special screening for malignant neoplasms, colon-brother has a history of polyps and she is on a 5-year cycle.  We discussed Cologuard but she thinks she will just move forward with a colonoscopy  -     Ambulatory referral for Colonoscopy    Eczema, unspecified type-hyperpigmentation along the left side of her back that supposedly was biopsied.  We will retrial the clobetasol again.  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  She might consider going back to that dermatologist again  -     clobetasol (TEMOVATE) 0.05 % cream; Apply 1 application topically 2 (two) times a day.    Hypothyroidism  -     levothyroxine (SYNTHROID, LEVOTHROID) 75 MCG tablet; Take 1 tablet (75 mcg total) by mouth daily.    Other orders  -     Td, Preservative Free (green label)      Patient instructions:   -for the numbness in your legs: will check iron levels and thyroid and B12  -if all those are normal then magnesium 400mg daily. If no improvement then,   - we could do a trial of holding your simvastatin for 1-2 months and see if symptoms resolve. If they do then I would either say- dont go back on medication and recheck levels in 3-4 months < 190 LDL  dont go back on meds   OR if you dont feel comfortable because of family history then we could just put you back on rosuvastatin \"crestor\" better tolerated for cramps.   -consider other work up for nerve and blood flow.       -continue the prolia for now and dexa scan in 2 years.  Ill check vitamin D today  . Cancel appt with visekruna and keep shot appt.     -consider weaning or reducing omeprazole and take as needed. Might want to consider weaning first to every other day, then every 3 days. If rebound symptoms then start zantac once to twice daily to " bridge for 2 weeks as you wean.    -check with your insurance on shingrix.     -consider advance care directive     -can consider topical estrogen cream for itching and dryness     CHIEF COMPLAINT:  Female preventive exam.    SUBJECTIVE:  Lynette Tai is a 64 y.o. female presents to the clinic today for a physical exam along with Prolia shot, leg problem, hyperlipidemia, puntius, acid reflux, and health maintenance.    The patient has been taking care of her  because he has his knee replaced at the beginning of May and he has recovered well. He has a golf tournament this weekend.     Prolia shot: Patient has osteoporosis and high fracture risk, reportedly receiving Prolia therapy. there has been a  -6.9 % change in the bone density of the spine on 5/21/2019. She had a delay for an injection because she had a dental implant. She wonders if she can do the prolia injections somewhere else because she was concerned that there was some miscommunication of how many injections that she needed to get.  She is under the impression she was not supposed to receive more than 2 and now is due for her seventh injection.  She is okay with continuing at that clinic when things are clarified    Leg problem: Patient has a burning tingliness in both legs at night for a month. She describes it as if you were sunburned and your skin is tight. She takes her cholesterol medication at night. She stopped taking the ZOCOR not more than a week. She does not think her symptoms improved.  Dose was increased to last year    Hyperlipidemia: Patient had a toast and coffee at 6:15 AM. Patient's last cholesterol was 246 on 7/13/18.     Eczema patient went to a Dermatologist and they did a scrape and she was diagnosed with Pickers Disease. It was never a dry patch or an outbreak when she scratched it. She applied the cream on it twice a day. The patient has an underline itch. If she touches it, then she would want to scratch it. She  had residual itching from where the mole was removed by the dermatologist. She had darker pigment appear prior to the itching.     Acid reflux: Patient's acid reflux is well controlled with the omeprazole. She does not wake up as much anymore. She has been sleeping well lately.  She does not take it daily anymore only as needed and she knows what her food triggers are    Health Maintenance: She will continue the prolia and DXA scan for 2 years. She is going to do a tetanus shot and Hep C screening today. She is checking her insurance with the shingrix and is told the risks and benefits of it. Her brother had Polyps and the patient had a colonoscopy back in 2013. The patient does not have an advance care directive.     She  has a past medical history of Intermittent asthma, Intermittent asthma, and Osteoporosis.    Lab Results   Component Value Date    WBC 5.7 07/29/2019    HGB 12.7 07/29/2019    HCT 37.6 07/29/2019    MCV 91 07/29/2019     07/29/2019     06/12/2019    K 5.2 (H) 06/12/2019    BUN 18 06/12/2019     Lab Results   Component Value Date    CHOL 257 (H) 07/29/2019    HDL 76 07/29/2019    LDLCALC 166 (H) 07/29/2019    TRIG 73 07/29/2019     Lab Results   Component Value Date    TSH 0.22 (L) 07/29/2019     BP Readings from Last 3 Encounters:   07/29/19 104/60   06/12/19 106/72   06/06/19 104/65       Surgeries:    Past Surgical History:   Procedure Laterality Date     appendix      10/1972     CHOLECYSTECTOMY      5/1986     OVARIAN CYST REMOVAL      10/1972     MS OSTEOTOMY CLAVICLE      Description: Osteotomy Of The Clavicle;  Recorded: 02/22/2012;       Family History:  Her family history includes Breast cancer in her mother; Coronary artery disease in her father and mother; Lung cancer in her sister; Osteoporosis in her mother; Prostate cancer in her father; Sleep apnea in her brother.    Social History:  She  reports that she has never smoked. She has never used smokeless tobacco. She  reports that she drinks alcohol.    Medications:    Current Outpatient Medications:      cholecalciferol, vitamin D3, 5,000 unit Tab, Take 1 tablet by mouth daily., Disp: , Rfl:      levothyroxine (SYNTHROID, LEVOTHROID) 75 MCG tablet, Take 1 tablet (75 mcg total) by mouth daily., Disp: 90 tablet, Rfl: 4     multivitamin with minerals (THERA-M) 9 mg iron-400 mcg Tab tablet, Take 1 tablet by mouth daily., Disp: , Rfl:      omeprazole (PRILOSEC) 20 MG capsule, TAKE 1 CAPSULE(20 MG) BY MOUTH DAILY BEFORE BREAKFAST, Disp: 90 capsule, Rfl: 3     clobetasol (TEMOVATE) 0.05 % cream, Apply 1 application topically 2 (two) times a day., Disp: 30 g, Rfl: 1     rosuvastatin (CRESTOR) 10 MG tablet, Take 1 tablet (10 mg total) by mouth at bedtime. For cholesterol, Disp: 90 tablet, Rfl: 4    Current Facility-Administered Medications:      denosumab 60 mg (PROLIA 60 mg/ml), 60 mg, Subcutaneous, Q6 Months, Adeline Mahoney MD, 60 mg at 06/06/19 1418  HELD MEDICATIONS: None.    Allergies:  No latex allergies.  Allergies   Allergen Reactions     Codeine      Nausea      Hydrocodone-Acetaminophen      nausesa      Other Environmental Allergy      Sulfa (Sulfonamide Antibiotics)      Itchy eyes        RISK BEHAVIOR & HEALTH HABITS  Regular Exercise: Patient exercises 4-5 days a week by biking and walking. Stretches a lot after workouts.   Calcium intake/Osteoporosis prevention: Prolia injection therapy. She is taking calcium with magnesium.     Guns: NO  Sun Screen: YES  Dental Care: YES    REVIEW OF SYSTEMS:  Complete head to toe review of systems is otherwise negative except as above.    OBJECTIVE:  VITAL SIGNS:    Vitals:    07/29/19 1416   BP: 104/60   Pulse: 68   Resp: 18   Temp: 97.2  F (36.2  C)   SpO2: 98%     GENERAL:  Patient alert, in no acute distress.  EYES: PERRLA. Extraoccular movements intact, pupils equal, reactive to light and accommodation.  Normal conjunctiva and lids.  Undilated fudoscopic exam normal,  including normal size, appearance cup-to-disc ratio.  Normal posterior segments, including no exudates or hemorrhages.  ENT:  Hearing grossly normal.  Normal appearance to ears and nose.  Bilateral TM s, external canals, oropharynx normal. Normal lips, gums and teeth.  Normal nasal mucosa, septum and turbinates.  NECK:  Supple, without thyromegaly or mass.  RESP:  Clear to auscultation without crackles, wheezes or distress.  Normal respiratory effort.   CV:  Regular rate and rhythm without murmurs, rubs or gallops.  Normal carotid, abdominal aorta, femoral and pedal pulses.  No varicosities or edema.  ABDOMEN:  Soft, non-tender, without hepatosplenomegaly, masses, or hernias.   BREASTS:  Nontender, without masses, nipple discharge, erythema, or axillary adenopathy.    : Normal bimanual exam lYMPHATIC: Normal palpation of neck, groin and axilla..  No lymphadenopathy.  No bruising.  NEURO:  CN II-XII intact, motor & sensory function all intact.  DTR and reflexes normal.  PSYCHIATRIC:  Alert & oriented with normal mood and affect.  Good judgment and insight.  SKIN: Hyperpigmentation paraspinal muscles on the left mid back  MUSCULOSKELETAL: Normal gait and station.  - Spine / Ribs / Pelvis: Normal inspection, ROM, stability and strength: Spine, Head, Neck, Upper and Lower Extremities.    General  Immunizations reviewed and updated .  Recommended adequate calcium intake/osteoporosis prevention.  Discussed colon cancer screening at age 50, 45 if -American.  Diet and exercise reviewed, including goal of aerobic exercise 30-90 minutes most days of the week, moderation of portions sizes, avoiding eating out and fast food and increase in fruits and vegetables.    ADDITIONAL HISTORY SUMMARIZED (2): Reviewed office note with Dr. Michael regarding Prolia injection.  Reviewed colonoscopy.  DECISION TO OBTAIN EXTRA INFORMATION (1): None.   RADIOLOGY TESTS (1): Reviewed DXA, 5/21/19.  Reviewed mammogram.  LABS (1):  Ordered labs today.   MEDICINE TESTS (1): None.  INDEPENDENT REVIEW (2 each): None.     The visit lasted a total of 46 minutes face to face with the patient. Over 50% of the time was spent counseling and educating the patient about Prolia shot, leg problem, hyperlipidemia, puntius, acid reflux, and health maintenance.    ILori, am scribing for and in the presence of, Dr. He.    I, Dr. He, personally performed the services described in this documentation, as scribed by Lori Henriquez in my presence, and it is both accurate and complete.    Total Data Points: 4

## 2021-05-30 NOTE — PATIENT INSTRUCTIONS - HE
"-for the numbness in your legs: will check iron levels and thyroid and B12  -if all those are normal then magnesium 400mg daily. If no improvement then,   - we could do a trial of holding your simvastatin for 1-2 months and see if symptoms resolve. If they do then I would either say- dont go back on medication and recheck levels in 3-4 months < 190 LDL  dont go back on meds   OR if you dont feel comfortable because of family history then we could just put you back on rosuvastatin \"crestor\" better tolerated for cramps.   -consider other work up for nerve and blood flow.       -continue the prolia for now and dexa scan in 2 years.  Ill check vitamin D today  . Cancel appt with visekruna and keep shot appt.     -consider weaning or reducing omeprazole and take as needed. Might want to consider weaning first to every other day, then every 3 days. If rebound symptoms then start zantac once to twice daily to bridge for 2 weeks as you wean.    -check with your insurance on shingrix.     -consider advance care directive     -can consider topical estrogen cream for itching and dryness   "

## 2021-05-30 NOTE — TELEPHONE ENCOUNTER
Due for labs    Rx renewed per Medication Renewal Policy. Medication was last renewed on 3/23/19.    Marissa Jacobs, [facility] Triage/Med Refill 7/2/2019     Requested Prescriptions   Pending Prescriptions Disp Refills     levothyroxine (SYNTHROID, LEVOTHROID) 75 MCG tablet [Pharmacy Med Name: LEVOTHYROXINE 0.075MG (75MCG) TABS] 90 tablet 0     Sig: TAKE 1 TABLET BY MOUTH EVERY DAY       Thyroid Hormones Protocol Passed - 7/1/2019  4:22 PM        Passed - Provider visit in past 12 months or next 3 months     Last office visit with prescriber/PCP: 3/16/2017 Leona He DO OR same dept: 6/12/2019 Susannah Vargas CNP OR same specialty: 6/12/2019 Susannah Vargas CNP  Last physical: 7/13/2018 Last MTM visit: Visit date not found   Next visit within 3 mo: Visit date not found  Next physical within 3 mo: Visit date not found  Prescriber OR PCP: Leona He DO  Last diagnosis associated with med order: 1. Hypothyroidism  - levothyroxine (SYNTHROID, LEVOTHROID) 75 MCG tablet [Pharmacy Med Name: LEVOTHYROXINE 0.075MG (75MCG) TABS]; TAKE 1 TABLET BY MOUTH EVERY DAY  Dispense: 90 tablet; Refill: 0    If protocol passes may refill for 12 months if within 3 months of last provider visit (or a total of 15 months).             Passed - TSH on file in past 12 months for patient age 12 & older     TSH   Date Value Ref Range Status   07/13/2018 0.38 0.30 - 5.00 uIU/mL Final

## 2021-05-31 VITALS — WEIGHT: 126 LBS | BODY MASS INDEX: 21.46 KG/M2

## 2021-05-31 VITALS — BODY MASS INDEX: 22.57 KG/M2 | WEIGHT: 132.5 LBS

## 2021-06-01 ENCOUNTER — RECORDS - HEALTHEAST (OUTPATIENT)
Dept: ADMINISTRATIVE | Facility: CLINIC | Age: 66
End: 2021-06-01

## 2021-06-01 VITALS — HEIGHT: 65 IN | BODY MASS INDEX: 22.01 KG/M2 | WEIGHT: 132.1 LBS

## 2021-06-02 NOTE — PROGRESS NOTES
Assessment:       1. Acute non-recurrent frontal sinusitis  doxycycline (VIBRA-TABS) 100 MG tablet     Medical Decision Making  Patient presents with sinus congestion and facial pressure concerning for bacterial sinusitis.  Symptoms have been present for over 1 week with subjective fevers and chills.      Plan:       Oral antibiotics per orders.  Trial of nasal steroids and over-the-counter decongestants discussed.  Recommended plenty of fluids.  Discussed signs of worsening symptoms and when to follow-up with PCP if no symptom improvement.      Patient Instructions   You were seen today for a sinus infection.    Symptoms management:  - May use Tylenol or Ibuprofen for discomfort and/or fever if present  - May try saline irrigation to relieve congestion (see instructions below)  - Use of nasal steroids (Flonase) as prescribed  - Take antibiotics as prescribed for the full course (even if symptoms have improved)  - If you are experiencing ear fullness, may try an oral decongestant such as sudafed    Reasons to come back for re-evaluation:  - Develop a fever of 100.4F or current fever worsens  - Troubles seeing or double vision  - Swelling or redness around one or both eyes  - Sfiff neck  - Symptoms are not improving over the next 5 days    Buffered normal saline nasal irrigation   The benefits   1. Saline (saltwater) washes the mucus and irritants from your nose.   2. The sinus passages are moisturized.   3. Studies have also shown that a nasal irrigation improves cell function (the cells that move the mucus work better).   The recipe   Use a one-quart glass jar that is thoroughly cleansed.   You may use a large medical syringe (30 cc), water pick with an irrigation tip (preferred method), squeeze bottle, or Neti pot. Do not use a baby bulb syringe. The syringe or pick should be sterilized frequently or replaced every two to three weeks to avoid contamination and infection.   Fill with water that has been  distilled, previously boiled, or otherwise sterilized. Plain tap water is not recommended, because it is not necessarily sterile.   Add 1 to 1  heaping teaspoons of pickling/markie salt. Do not use table salt, because it contains a large number of additives.   Add 1 teaspoon of baking soda (pure bicarbonate).   Mix ingredients together, and store at room temperature. Discard after one week.   You may also make up a solution from premixed packets that are commercially prepared specifically for nasal irrigation.   The instructions   Irrigate your nose with saline one to two times per day.   If you have been told to use nasal medication, you should always use your saline solution first. The nasal medication is much more effective when sprayed onto clean nasal membranes, and the spray will reach deeper into the nose.   Pour the amount of fluid you plan to use into a clean bowl. Do not put your used syringe back into the storage container, because it contaminates your solution.   You may warm the solution slightly in the microwave, but be sure that the solution is not hot.   Bend over the sink (some people do this in the shower) and squirt the solution into each side of your nose, aiming the stream toward the back of your head, not the top of your head. The solution should flow into one nostril and out of the other, but it will not harm you if you swallow a little.   Some people experience a little burning sensation the first few times they use buffered saline solution, but this usually goes away after they adapt to it.         Subjective:       Lynette Tai is a 64 y.o. female here for evaluation of sinus congestion.  Onset of symptoms was 1 week ago.  Symptoms were initially improving until suddenly worsening again last night.  Associated symptoms include facial pressure on the right side of the face and low-grade fevers.  Patient has a wet cough but denies shortness of breath and chest tightness.  She has  been taking Mucinex and [place] cold and sinus with some relief.    The following portions of the patient's history were reviewed and updated as appropriate: allergies, current medications and problem list.    Review of Systems  Pertinent items are noted in HPI.     Allergies  Allergies   Allergen Reactions     Codeine      Nausea      Hydrocodone-Acetaminophen      nausesa      Other Environmental Allergy      Sulfa (Sulfonamide Antibiotics)      Itchy eyes        Family History   Problem Relation Age of Onset     Coronary artery disease Mother         bypass  70s, smoked, overweight      Osteoporosis Mother      Breast cancer Mother         dx 70      Coronary artery disease Father         MI- smoker 54     Prostate cancer Father         met to bone      Lung cancer Sister         smoker,  age 54     Sleep apnea Brother        Social History     Socioeconomic History     Marital status:      Spouse name: None     Number of children: None     Years of education: None     Highest education level: None   Occupational History     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    Social Needs     Financial resource strain: None     Food insecurity:     Worry: None     Inability: None     Transportation needs:     Medical: None     Non-medical: None   Tobacco Use     Smoking status: Never Smoker     Smokeless tobacco: Never Used   Substance and Sexual Activity     Alcohol use: Yes     Comment: 2     Drug use: None     Sexual activity: Yes   Lifestyle     Physical activity:     Days per week: None     Minutes per session: None     Stress: None   Relationships     Social connections:     Talks on phone: None     Gets together: None     Attends Yarsanism service: None     Active member of club or organization: None     Attends meetings of clubs or organizations: None     Relationship status: None     Intimate partner violence:     Fear of current or ex partner: None     Emotionally abused: None      Physically abused: None     Forced sexual activity: None   Other Topics Concern     None   Social History Narrative     None         Objective:       /74   Pulse 96   Temp 98  F (36.7  C) (Oral)   Resp 16   Wt 132 lb 12.8 oz (60.2 kg)   SpO2 95%   Breastfeeding? No   BMI 22.80 kg/m    General appearance: alert, appears stated age, cooperative, no distress and non-toxic  Head: Normocephalic, without obvious abnormality, atraumatic, Frontal and maxillary right sinuses tender to percussion  Ears: TMs intact with mucoid fluid and mild bulging, no erythema  Nose: no discharge  Throat: lips, mucosa, and tongue normal; teeth and gums normal  Neck: no adenopathy and supple, symmetrical, trachea midline  Lungs: No rhonchi, rales, or wheezing  Heart: regular rate and rhythm, S1, S2 normal, no murmur, click, rub or gallop

## 2021-06-03 VITALS — BODY MASS INDEX: 22.3 KG/M2 | WEIGHT: 130.6 LBS | HEIGHT: 64 IN

## 2021-06-03 VITALS
TEMPERATURE: 98 F | SYSTOLIC BLOOD PRESSURE: 114 MMHG | HEART RATE: 96 BPM | DIASTOLIC BLOOD PRESSURE: 74 MMHG | RESPIRATION RATE: 16 BRPM | OXYGEN SATURATION: 95 % | WEIGHT: 132.8 LBS | BODY MASS INDEX: 22.8 KG/M2

## 2021-06-03 VITALS — BODY MASS INDEX: 21.66 KG/M2 | WEIGHT: 130 LBS | HEIGHT: 65 IN

## 2021-06-03 VITALS — WEIGHT: 130.5 LBS | BODY MASS INDEX: 22.05 KG/M2

## 2021-06-04 VITALS
WEIGHT: 131.8 LBS | DIASTOLIC BLOOD PRESSURE: 63 MMHG | SYSTOLIC BLOOD PRESSURE: 110 MMHG | HEART RATE: 73 BPM | BODY MASS INDEX: 22.62 KG/M2 | OXYGEN SATURATION: 98 %

## 2021-06-04 NOTE — PROGRESS NOTES
"Prolia Injection Phone Screen      Screening questions have been asked 2-3 days prior to administration visit for Prolia. If any questions are answered with \"Yes,\" this phone encounter were will routed to ordering provider for further evaluation.     1.  When was the last injection?  06/6/2019    2.  Has insurance for this injection been verified?  Yes    3.  Did you experience any new onset achiness or rashes that lasted for over a month with your previous Prolia injection?   No    4.  Do you have a fever over 101?F or a new deep cough that is unusual for you today? No    5.  Have you started any new medications in the last 6 months that you were told could affect your immune system? These may have been prescribed by oncologist, transplant, rheumatology, or dermatology.   No    6.  In the last 6 months have you have gastric bypass or parathyroid surgery?   No    7.  Do you plan dental work requiring drilling into the bone such as implants/extractions or oral surgery in the next 2-3 months?   No    8. Do you have new insurance since the last injection? No    Patient informed if symptoms discussed above present prior to their administration appointment, they are to notify clinic immediately.     Carmen Garrido                The following steps were completed to comply with the REMS program for Prolia:  1. Ordering provider has previously reviewed information in the Medication Guide and Patient Counseling Chart, including the serious risks of Prolia  and the symptoms of each risk and have been advised to seek prompt medical attention if they have signs or symptoms of any of the serious risks.  2. Provided each patient a copy of the Medication Guide and Patient Brochure.  See MAR for administration details.   Indication: Prolia  (denosumab) is a prescription medicine used to treat osteoporosis in patients who:   Are at high risk for fracture, meaning patients who have had a fracture related to osteoporosis, or who " have multiple risk factors for fracture; Cannot use another osteoporosis medicine or other osteoporosis medicines did not work well.   The timeline for early/late injections would be 4 weeks early and any time after the 6 month edwardo. If a patient receives their injection late, then the subsequent injection would be 6 months from the date that they actually received the injection    Have the screening questions been asked prior to this administration? Yes

## 2021-06-05 VITALS
DIASTOLIC BLOOD PRESSURE: 65 MMHG | SYSTOLIC BLOOD PRESSURE: 116 MMHG | BODY MASS INDEX: 22.09 KG/M2 | RESPIRATION RATE: 16 BRPM | TEMPERATURE: 98.3 F | WEIGHT: 132.6 LBS | HEIGHT: 65 IN | OXYGEN SATURATION: 97 % | HEART RATE: 80 BPM

## 2021-06-06 NOTE — TELEPHONE ENCOUNTER
Who is calling:  Patient   Reason for Call:  Patient states she received a call from clinic stating that she needs to reschedule her appointment with Julius Anaya  on 06/10/20. Writer did not see any documentation about calling patient appointment . Please call patient with information .  Date of last appointment with primary care: 07/29/19  Okay to leave a detailed message: No

## 2021-06-08 NOTE — PATIENT INSTRUCTIONS - HE
Prolia 9th today.  Prolia 10th in 6 months with my nurse. I will see you in 1 year.    DXA - due now .   Phone number to schedule [phone].    Daily calcium need is 0932-9809 mg a day from the diet and supplements.  Calcium citrate is easier to digest.  Vitamin D 2000 IU daily recommended.

## 2021-06-08 NOTE — PROGRESS NOTES
Chief Complaint   Patient presents with     Prolia #3     1. Did you experience any problems with previous Prolia injection? NO  2. Do you feel sick today?(fever, RS, GI,  issues)? NO  3. Any medication changes in the last 6 months? NO  4. Did you take prednisone or other immunosuppressant drugs in the last 6 months?(chemo, transplant, rheu, dermatology conditions)? NO  5. Did you have any serious infection in the last 6 months? (pancreatitis) NO  6. Any recent hospitalizations/ surgeries (especially gastric bypass, thyroid, parathyroid)? NO  7. Do you plan any dental work?(especially implants and extractions) in the next 2-3 months? NO  8. Did you have any fractures in the last year? NO    Albertina Kaminski CMA [facility] 2/9/2017 8:45 AM

## 2021-06-08 NOTE — PROGRESS NOTES
1. Osteoporosis without current pathological fracture, unspecified osteoporosis type       Patient was educated on safety of Prolia utilizing Patient Counseling Chart for Healthcare Providers, as outlined by the Prolia REMS progam.     Return in about 6 months (around 12/16/2020) for Prolia injection.    Patient Instructions   Prolia 9th today.  Prolia 10th in 6 months with my nurse. I will see you in 1 year.    DXA - due now .   Phone number to schedule [phone].    Daily calcium need is 9583-6234 mg a day from the diet and supplements.  Calcium citrate is easier to digest.  Vitamin D 2000 IU daily recommended.      Chief Complaint   Patient presents with     Osteoporosis Follow Up     Osteo F/U       /63   Pulse 73   Wt 131 lb 12.8 oz (59.8 kg)   SpO2 98%   BMI 22.62 kg/m        Did you experience any problems with previous Prolia injection? no  Any medication change in the last 6 months? no  Did you take prednisone or other immunosupressant drugs in the last 6 months   (chemo, transplant, rheum, dermatology conditions)? no  Did you have any serious infection in the last 6 months?no  Any recent hospitalizations?no  Do you plan any dental work in the next 2-3 months?no  How much calcium do you take daily from the diet and supplements?1200 mg  How much vit D do you take daily? 2000 IU  Last DXA?  6/2019, Reviewed and discussed      Patient is here today for the 9th Prolia injection. Patient tolerated previous injections well.   We discussed calcium and vit D daily needs today.   I reviewed the labs:  Component      Latest Ref Rng & Units 7/29/2019   Vitamin D, Total (25-Hydroxy)      30.0 - 80.0 ng/mL 31.0     Component      Latest Ref Rng & Units 6/12/2019   Sodium      136 - 145 mmol/L 138   Potassium      3.5 - 5.0 mmol/L 5.2 (H)   Chloride      98 - 107 mmol/L 103   CO2      22 - 31 mmol/L 27   Anion Gap, Calculation      5 - 18 mmol/L 8   Glucose      70 - 125 mg/dL 97   Calcium      8.5 - 10.5  mg/dL 9.7   BUN      8 - 22 mg/dL 18   Creatinine      0.60 - 1.10 mg/dL 0.87   GFR MDRD Af Amer      >60 mL/min/1.73m2 >60   GFR MDRD Non Af Amer      >60 mL/min/1.73m2 >60     Next Prolia injection will be in 6 months.     Patient was educated on safety of Prolia utilizing Patient Counseling Chart for Healthcare Providers, as outlined by the Prolia REMS progam.     15 minutes spent with the patient and more then 50 % of the time in counseling about osteoporosis, available osteoporosis treatment options, medication side effects and contraindications, supplement use and weightbearing exercise.    This note has been dictated using voice recognition software. Any grammatical or context distortions are unintentional and inherent to the software      Patient Active Problem List   Diagnosis     Hypothyroidism     Osteoporosis     Hyperlipidemia     GERD (gastroesophageal reflux disease)     Frequent headaches       Current Outpatient Medications on File Prior to Visit   Medication Sig Dispense Refill     cholecalciferol, vitamin D3, 5,000 unit Tab Take 1 tablet by mouth daily.       clobetasol (TEMOVATE) 0.05 % cream Apply 1 application topically 2 (two) times a day. 30 g 1     levothyroxine (SYNTHROID, LEVOTHROID) 75 MCG tablet Take 1 tablet (75 mcg total) by mouth daily. 90 tablet 4     multivitamin with minerals (THERA-M) 9 mg iron-400 mcg Tab tablet Take 1 tablet by mouth daily.       rosuvastatin (CRESTOR) 10 MG tablet Take 1 tablet (10 mg total) by mouth at bedtime. For cholesterol 90 tablet 4     [DISCONTINUED] omeprazole (PRILOSEC) 20 MG capsule TAKE 1 CAPSULE(20 MG) BY MOUTH DAILY BEFORE BREAKFAST 90 capsule 3     Current Facility-Administered Medications on File Prior to Visit   Medication Dose Route Frequency Provider Last Rate Last Dose     denosumab 60 mg (PROLIA 60 mg/ml)  60 mg Subcutaneous Q6 Months Julius Michael MD   60 mg at 12/11/19 1033     [DISCONTINUED] denosumab 60 mg (PROLIA 60 mg/ml)  60 mg  Subcutaneous Q6 Months Adeline Mahoney MD   60 mg at 06/06/19 6119

## 2021-06-09 NOTE — PROGRESS NOTES
ASSESSMENT & PLAN:  Lynette was seen today for medication management.     Diagnoses and all orders for this visit:     Hypothyroidism-well-controlled at this time. Continue levothyroxine 75  g daily. Mild increase in hot flashes however not enough to warrant repeat testing. Will reassess in 6 months when she comes in for complete physical     Osteoporosis-think that he has had improvement in her bone density scan in January 2017. She is on Prolia and has received 3 injections. She is going to see Dr. espinoza this August before deciding whether or not to continue with the Prolia. No adverse side effects. She continues on calcium and vitamin D supplementation. We discussed activity level and okay to do Sarah with her osteoporosis.     Unspecified hypothyroidism  - levothyroxine (SYNTHROID, LEVOTHROID) 75 MCG tablet; TAKE 1 TABLET BY MOUTH EVERY DAY     Hyperlipidemia-family history of heart disease however patient is not a smoker with adequate blood pressure control and parents were smokers. Unfortunately her cholesterol has been quite high in the past in terms of an LDL greater than 200. We decreased her simvastatin last visit from 20 mg to 10 mg and will reevaluate her lipid panel today to determine whether or not 10 mg is appropriate or if we should go back up to 20 mg rather than discontinuing completely which we had entertained the thought of doing in the past  - Lipid Cascade     Other orders  - Varicella-zoster vaccine subq     There are no Patient Instructions on file for this visit.     Orders Placed This Encounter   Procedures     Varicella-zoster vaccine subq     Lipid Cascade     Order Specific Question:   Fasting is required?     Answer:   Yes     Medications Discontinued During This Encounter   Medication Reason     selenium sulfide (SELSUN) 2.5 % lotion Therapy completed     levothyroxine (SYNTHROID, LEVOTHROID) 75 MCG tablet Reorder       No Follow-up on file.     CHIEF COMPLAINT:  Chief Complaint    Patient presents with     Medication Management     Follow up on Dxa, chol and thyroid.         HISTORY OF PRESENT ILLNESS:  Lynette is a 61 y.o. female presenting to the clinic today to follow up on her osteoporosis, hyperlipidemia, and hypothyroidism.      Osteoporosis: Dr. Michael thought her bone density looked better, so she had a Prolia injection without an office visit on 2/9/2017 and she will follow up again with Dr. Michael in August 2017. She has had three Prolia injections now and she will see Dr. Michael before having another injection; she is not sure how long she is supposed to continue Prolia injections. She denies any noticeable side effects from Prolia. She has been golfing and she likes to do Sarah; she is wondering if bouncing and twisting movements in her exercise regimen are bad for her. She tries to do low weight bearing exercises as well.     Hyperlipidemia: She was taking simvastatin 10 mg daily since her cholesterol was last checked in June 2016; she has not been taking simvastatin for a week now because she went out of town before her prescription was refilled. Her LDL was 138 on 6/28/2016 and 204 on 11/10/2014. Simvastatin does not bother her. She notes she went back and forth with Dr. Panda for a long time because her cholesterol has been high, she has a family history of heart disease, but she is otherwise healthy. Her parents both had heart disease, but she is not sure what their cholesterol levels were. Her parents were both overweight and they were both smokers. Her father was 55 when he had a heart attack; he then quit smoking and lived to be 79. Her mother had multiple health issues but lived to age 89.     Hypothyroidism/Hot Flashes: She has been having more hot flashes and hair loss lately. She has been feeling very good otherwise lately. She denies any increased anxiety or heart racing. She is tolerating levothyroxine 75 mcg daily.     GERD: She wakes up with  burning in her epigastric region intermittently; Dr. Panda told her to take Zantac rather than Prilosec. She has never taken Zantac regularly, but she is wondering if it is safe to take often. She notes tomato-based foods tend to trigger her symptoms. She never has symptoms during the day, but she does wake up at night feeling burning. In the past two months, her sleeping pattern has changed. She has been waking up at 3 am recently; she is not sure if she is too hot or if it is the burning.     Health Maintenance: She is fasting today. She is planning to get a Shingles vaccine today; she had very mild Shingles in the past but it was still miserable for her. She exercises a couple times per week. She notes she has not had a Pap smear in a while; she denies any history of abnormal Pap smears.      REVIEW OF SYSTEMS:   Allergies/Hx Asthma: She had severe asthma as a child, but she grew up in house of heavy smokers. Her asthma was not well-controlled in her 20's and her asthma has been getting better over times since she had her children. She notices some shortness of breath when she runs, but the only time she has a hard time breathing is when she is around horses, cats, or when her allergies flare. Her daughter has asthma and uses an inhaler regularly. She typically takes Allavert or Zyrtec for her allergies which works well for her. It has been years since she needed to use an inhaler. She hated the feeling of using inhalers in the past.     She had a full body skin check recently. She will follow up again in one year. She has a dark spot on her back and she has a few spots that itch; she had been using Selsun but her doctor gave her clobetasol to use instead. She had two spots on her chest which she was scratching, and they are gone today. All other systems are negative.     PFSH:    TOBACCO USE:  History   Smoking Status     Never Smoker   Smokeless Tobacco     Not on file        VITALS:  Vitals:    03/16/17  0926   BP: 116/76   Pulse: 68   Resp: 16   Temp: 98.5  F (36.9  C)   TempSrc: Oral   Weight: 128 lb (58.1 kg)     Wt Readings from Last 3 Encounters:   03/16/17 128 lb (58.1 kg)   08/03/16 134 lb (60.8 kg)   06/28/16 136 lb (61.7 kg)     Body mass index is 21.8 kg/(m^2).     PHYSICAL EXAM:  GENERAL:  Reveals an alert 61 y.o. female in NAD.  Vitals:  Per nursing notes.  NECK: Supple, thyroid not palpable.  EYES: Wearing glasses.   CARDIAC:  Regular rate and rhythm without murmurs, rubs, or gallops. Legs without edema.  LUNGS: Clear.  Respiratory effort normal.  PSYCH:  Mood appropriate, memory intact.        DATA REVIEWED:  ADDITIONAL HISTORY SUMMARIZED (2): Reviewed osteoporosis note 8/3/2016.  DECISION TO OBTAIN EXTRA INFORMATION (1): None.   RADIOLOGY TESTS (1): Reviewed DXA report 1/6/2017.  LABS (1): Reviewed and ordered labs.  MEDICINE TESTS (1): None.  INDEPENDENT REVIEW (2 each): None.     The visit lasted a total of 22 minutes face to face with the patient. Over 50% of the time was spent counseling and educating the patient about her osteoporosis, hyperlipidemia, and acid reflux symptoms.     INisha, am scribing for and in the presence of Dr. He.  I, Dr. He, personally performed the services described in this documentation, as scribed by Nisha Carrillo in my presence, and it is both accurate and complete.     MEDICATIONS:  Current Outpatient Prescriptions   Medication Sig Dispense Refill     cholecalciferol, vitamin D3, 5,000 unit Tab Take 1 tablet by mouth daily.       clobetasol (TEMOVATE) 0.05 % cream Apply 1 application topically 2 (two) times a day.  2     levothyroxine (SYNTHROID, LEVOTHROID) 75 MCG tablet TAKE 1 TABLET BY MOUTH EVERY DAY 90 tablet 0     multivitamin with minerals (THERA-M) 9 mg iron-400 mcg Tab tablet Take 1 tablet by mouth daily.       simvastatin (ZOCOR) 10 MG tablet Take 1 tab daily 90 tablet 2     Current Facility-Administered Medications   Medication Dose Route  Frequency Provider Last Rate Last Dose     denosumab 60 mg (PROLIA 60 mg/ml)  60 mg Subcutaneous Q6 Months Adeline Mahoney MD   60 mg at 02/04/16 1210        Total data points: 4

## 2021-06-11 ENCOUNTER — AMBULATORY - HEALTHEAST (OUTPATIENT)
Dept: INTERNAL MEDICINE | Facility: CLINIC | Age: 66
End: 2021-06-11

## 2021-06-11 DIAGNOSIS — M81.0 AGE RELATED OSTEOPOROSIS, UNSPECIFIED PATHOLOGICAL FRACTURE PRESENCE: ICD-10-CM

## 2021-06-11 DIAGNOSIS — K21.9 GASTROESOPHAGEAL REFLUX DISEASE WITHOUT ESOPHAGITIS: ICD-10-CM

## 2021-06-11 DIAGNOSIS — Z92.29 PERSONAL HISTORY OF OTHER DRUG THERAPY: ICD-10-CM

## 2021-06-11 NOTE — TELEPHONE ENCOUNTER
RN cannot approve Refill Request    RN can NOT refill this medication Protocol failed and NO refill given. Last office visit: 3/16/2017 Leona He DO Last Physical: 7/29/2019 Last MTM visit: Visit date not found Last visit same specialty: 6/12/2019 Susannah Vargas CNP.  Next visit within 3 mo: Visit date not found  Next physical within 3 mo: Visit date not found      Marissa Jacobs, [facility] Triage/Med Refill 9/24/2020    Requested Prescriptions   Pending Prescriptions Disp Refills     levothyroxine (SYNTHROID, LEVOTHROID) 75 MCG tablet 90 tablet 4     Sig: Take 1 tablet (75 mcg total) by mouth daily.       Thyroid Hormones Protocol Failed - 9/22/2020  6:22 PM        Failed - Provider visit in past 12 months or next 3 months     Last office visit with prescriber/PCP: 3/16/2017 Leona He DO OR same dept: Visit date not found OR same specialty: 6/12/2019 Susannah Vargas CNP  Last physical: 7/29/2019 Last MTM visit: Visit date not found   Next visit within 3 mo: Visit date not found  Next physical within 3 mo: Visit date not found  Prescriber OR PCP: Loena He DO  Last diagnosis associated with med order: 1. Hypothyroidism  - levothyroxine (SYNTHROID, LEVOTHROID) 75 MCG tablet; Take 1 tablet (75 mcg total) by mouth daily.  Dispense: 90 tablet; Refill: 4    If protocol passes may refill for 12 months if within 3 months of last provider visit (or a total of 15 months).             Failed - TSH on file in past 12 months for patient age 12 & older     TSH   Date Value Ref Range Status   07/29/2019 0.22 (L) 0.30 - 5.00 uIU/mL Final

## 2021-06-12 NOTE — PROGRESS NOTES
1. Hypothyroidism  Thyroid Stimulating Hormone (TSH)   2. Osteoporosis  DXA Bone Density Scan       Return in about 6 months (around 2/18/2018) for Recheck.    Patient Instructions   Prolia 4th today.  Prolia 5th in 6 months with my nurse. I will see you in 1 year.  DXA in 7/2018.   Phone number to schedule [phone].  Please avoid any extensive dental work as implants and teeth extractions for the next 1-2 months.  Daily calcium need is 2745-6973 mg a day from the diet and supplements.  Calcium citrate is easier to digest.  Vitamin D 2000 IU daily recommended.      Chief Complaint   Patient presents with     Osteoporosis Follow Up       /60  Pulse 68  Wt 132 lb 8 oz (60.1 kg)  BMI 22.57 kg/m2      Did you experience any problems with previous Prolia injection? no  Any medication change in the last 6 months? no  Did you take prednisone or other immunosupressant drugs in the last 6 months   (chemo, transplant, rheum, dermatology conditions)? no  Did you have any serious infection in the last 6 months?no  Any recent hospitalizations?no  Do you plan any dental work in the next 2-3 months?no  How much calcium do you take daily from the diet and supplements?1200 mg  How much vit D do you take daily? 2000 IU  Last DXA? 1/2017      Patient is here today for the 4th Prolia injection. Patient tolerated previous injections well.   We discussed calcium and vit D daily needs today.   Next Prolia injection will be in 6 months.   TSH will be checked today.    15 minutes spent with the patient and more then 50 % of the time in counseling.  This note has been dictated using voice recognition software. Any grammatical or context distortions are unintentional and inherent to the software      Patient Active Problem List   Diagnosis     Hypothyroidism     Osteoporosis     Hyperlipidemia       Current Outpatient Prescriptions on File Prior to Visit   Medication Sig Dispense Refill     cholecalciferol, vitamin D3, 5,000  unit Tab Take 1 tablet by mouth daily.       clobetasol (TEMOVATE) 0.05 % cream Apply 1 application topically 2 (two) times a day.  2     levothyroxine (SYNTHROID, LEVOTHROID) 75 MCG tablet TAKE 1 TABLET BY MOUTH EVERY DAY 90 tablet 0     multivitamin with minerals (THERA-M) 9 mg iron-400 mcg Tab tablet Take 1 tablet by mouth daily.       simvastatin (ZOCOR) 10 MG tablet Take 1 tab daily 90 tablet 2     [DISCONTINUED] levothyroxine (SYNTHROID, LEVOTHROID) 75 MCG tablet TAKE 1 TABLET BY MOUTH EVERY DAY 90 tablet 0     Current Facility-Administered Medications on File Prior to Visit   Medication Dose Route Frequency Provider Last Rate Last Dose     denosumab 60 mg (PROLIA 60 mg/ml)  60 mg Subcutaneous Q6 Months Adeline Mahoney MD   60 mg at 02/04/16 1218

## 2021-06-13 NOTE — TELEPHONE ENCOUNTER
Patient was in today for Prolia injection. Next appointment with Dr. Michael set up for 6/17/2021. She is due for a DXA. She will set this up. Order pended.  Carmen Garrido CMA ............... 9:50 AM, 12/17/20

## 2021-06-13 NOTE — PROGRESS NOTES
Assessment and Plan:      Patient has been advised of split billing requirements and indicates understanding: Fay Ojeda was seen today for welcome to medicare visit.    Diagnoses and all orders for this visit:    Welcome to Medicare preventive visit    Hypothyroidism, unspecified type    Other mononeuropathy  -     Iron and Transferrin Iron Binding Capacity  -     Vitamin B12    Familial hypercholesterolemia    Viral warts, unspecified type    Osteoporosis without current pathological fracture, unspecified osteoporosis type    Encounter for screening mammogram for breast cancer  -     Mammo Screening Bilateral; Future    Other orders  -     Pneumococcal conjugate vaccine 13-valent 6wks-17yrs; >50yrs        Patient is here for Medicare wellness visit but she is not technically on Medicare.  She declined vision screen and EKG today.  Discussed advance care directive.  She is back on rosuvastatin for cholesterol and we discussed she could recheck levels in a few months or we could wait a year to do so.  She is tolerating well.  I still do not have an explanation for her peripheral numbness on her lower left extremity.  We could consider EMG for further evaluation.  No external causes no recent exposures.  She does not have sensory changes on examination today and has full strength and range of motion.  No adverse effects from medications and we have checked labs in the past but she is having restless leg-like symptoms so we discussed adding on iron and B12 for further evaluation.  Previous ferritin was normal.  She is receiving Prolia injections for her osteoporosis which she will continue doing as well as getting back on her vitamin D since she was deficient she will continue doing weightbearing exercises.  Immunizations were updated today next year can have Pneumovax.  She will consider doing the shingles vaccine in the future.  She had 2 warts noted on examination today one on each hand that were treated  with liquid nitrogen.  We discussed she could come back for repeat treatment or can use Mediplast bandages and treat on her own at home.  She will let me know if she has any further concerns or need for follow-up.        Has stress incontinence when jumping or running around  The patient's current medical problems were reviewed.    I have had an Advance Directives discussion with the patient.  The following health maintenance schedule was reviewed with the patient and provided in printed form in the after visit summary:   Health Maintenance   Topic Date Due     HIV SCREENING  07/26/1970     ZOSTER VACCINES (2 of 3) 05/11/2017     MAMMOGRAM  07/26/2021     MEDICARE ANNUAL WELLNESS VISIT  11/30/2021     Pneumococcal Vaccine: 65+ Years (2 of 2 - PPSV23) 11/30/2021     FALL RISK ASSESSMENT  11/30/2021     LIPID  10/12/2025     COLORECTAL CANCER SCREENING  11/04/2025     ADVANCE CARE PLANNING  11/30/2025     TD 18+ HE  07/29/2029     HEPATITIS C SCREENING  Completed     INFLUENZA VACCINE RULE BASED  Completed     TDAP ADULT ONE TIME DOSE  Completed     Pneumococcal Vaccine: Pediatrics (0 to 5 Years) and At-Risk Patients (6 to 64 Years)  Aged Out     HEPATITIS B VACCINES  Aged Out        Subjective:   Chief Complaint: Lynette Tai is an 65 y.o. female here for a Welcome to Medicare visit.   HPI:  Here for welcome to medicare visit and has hx of hypothyroidism. Had long discussion about covid. On prolia injections for osteoporosis 8th given June 16. Taking calcium and vitamin D.   Health is really good. No problems. Still has the tingling in legs. sometimes  Wakes up out of sleep. Wondering if coming from sleep. Feels more at night. Feels in the lower distal leg. Feels like if had a sunburn. Comes and goes and doesn't always have it. Knows wasn't from cholesterol medication. No cramps. Started peleton since covid. Weight bearing for bones. 5x per week. Arms and legs, stretching and meditation. Hasnt lost a pound.  Good eaters.  Some increased sweet cravings but improved. In summer golfed. Going to [employer]. 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  will come home if has to.    scheduled to leave dec 28th.  Heart disease in family. Smokers. Not using medicare yet. Signed up for part A. Was missing vitamin D here and there.   Always had some restless leg symptoms. Had numbness  > 1 year. Not worsening . Feels if rubbing legs on sheet.   Sleep goes in spurts.    Not getting cramps as much since stretching. Or if dehydrated  Review of Systems:     Please see above.  The rest of the review of systems are negative for all systems.    Patient Care Team:  Leona He DO as PCP - General (Family Medicine)  Julius Michael MD as Assigned PCP     Patient Active Problem List   Diagnosis     Hypothyroidism     Osteoporosis     Hyperlipidemia     GERD (gastroesophageal reflux disease)     Frequent headaches     Past Medical History:   Diagnosis Date     Intermittent asthma      Intermittent asthma      Osteoporosis     a      Past Surgical History:   Procedure Laterality Date     appendix      10/1972     CHOLECYSTECTOMY      1986     OVARIAN CYST REMOVAL      10/1972     DE OSTEOTOMY CLAVICLE      Description: Osteotomy Of The Clavicle;  Recorded: 2012;      Family History   Problem Relation Age of Onset     Coronary artery disease Mother         bypass  70s, smoked, overweight      Osteoporosis Mother      Breast cancer Mother         dx 70      Coronary artery disease Father         MI- smoker 54     Prostate cancer Father         met to bone      Lung cancer Sister         smoker,  age 54     Sleep apnea Brother       Social History     Socioeconomic History     Marital status:      Spouse name: Not on file     Number of children: Not on file     Years of education: Not on file     Highest education level: Not on file   Occupational History     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    Social  Needs     Financial resource strain: Not on file     Food insecurity     Worry: Not on file     Inability: Not on file     Transportation needs     Medical: Not on file     Non-medical: Not on file   Tobacco Use     Smoking status: Never Smoker     Smokeless tobacco: Never Used   Substance and Sexual Activity     Alcohol use: Yes     Comment: 2     Drug use: Not on file     Sexual activity: Yes   Lifestyle     Physical activity     Days per week: Not on file     Minutes per session: Not on file     Stress: Not on file   Relationships     Social connections     Talks on phone: Not on file     Gets together: Not on file     Attends Yazidi service: Not on file     Active member of club or organization: Not on file     Attends meetings of clubs or organizations: Not on file     Relationship status: Not on file     Intimate partner violence     Fear of current or ex partner: Not on file     Emotionally abused: Not on file     Physically abused: Not on file     Forced sexual activity: Not on file   Other Topics Concern     Not on file   Social History Narrative     Not on file       Current Outpatient Medications   Medication Sig Dispense Refill     calcium carb and citrat-mag ox 200 mg calcium- 50 mg Tab Take by mouth.       cholecalciferol, vitamin D3, 5,000 unit Tab Take 1 tablet by mouth daily.       clobetasol (TEMOVATE) 0.05 % cream Apply 1 application topically 2 (two) times a day. 30 g 1     levothyroxine (SYNTHROID, LEVOTHROID) 75 MCG tablet Take 1 tablet (75 mcg total) by mouth daily. 90 tablet 0     multivitamin with minerals (THERA-M) 9 mg iron-400 mcg Tab tablet Take 1 tablet by mouth daily.       rosuvastatin (CRESTOR) 5 MG tablet Take 1 tablet (5 mg total) by mouth at bedtime. For cholesterol 90 tablet 4     Current Facility-Administered Medications   Medication Dose Route Frequency Provider Last Rate Last Admin     denosumab 60 mg (PROLIA 60 mg/ml)  60 mg Subcutaneous Q6 Months Julius Michael MD   60  "mg at 06/16/20 0940      Objective:   Vital Signs:   Visit Vitals  /65   Pulse 80   Temp 98.3  F (36.8  C) (Oral)   Resp 16   Ht 5' 4.5\" (1.638 m)   Wt 132 lb 9.6 oz (60.1 kg)   SpO2 97%   BMI 22.41 kg/m         EKG:   pt declined    VisionScreening:  Patient declines she goes to her the eye doctor    PHYSICAL EXAM    General Appearance:    Alert, cooperative, no distress, appears stated age   Head:    Normocephalic, without obvious abnormality, atraumatic   Eyes:    PERRL, conjunctiva/corneas clear, EOM's intact, fundi     benign, both eyes   Ears:    Normal TM's and external ear canals, both ears   Nose:   Nares normal, septum midline, mucosa normal, no drainage    or sinus tenderness   Throat:   Lips, mucosa, and tongue normal; teeth and gums normal   Neck:   Supple, symmetrical, trachea midline, no adenopathy;     thyroid:  no enlargement/tenderness/nodules; no carotid    bruit or JVD   Back:     Symmetric, no curvature, ROM normal, no CVA tenderness   Lungs:     Clear to auscultation bilaterally, respirations unlabored   Chest Wall:    No tenderness or deformity    Heart:    Regular rate and rhythm, S1 and S2 normal, no murmur, rub   or gallop   Breast Exam:    No tenderness, masses, or nipple abnormality   Abdomen:     Soft, non-tender, bowel sounds active all four quadrants,     no masses, no organomegaly   Genitalia:   deferred exam    Rectal:     Extremities:   Extremities normal, atraumatic, no cyanosis or edema   Pulses:   2+ and symmetric all extremities   Skin:  Small wart along finger of right hand and thumb of left hand, skin color, texture, turgor normal, no rashes or lesions   Lymph nodes:   Cervical, supraclavicular, and axillary nodes normal   Neurologic:   CNII-XII intact, normal strength, sensation and reflexes     Throughout. 5/5 muscle strength testing lower extremities.        Assessment Results 11/30/2020   Activities of Daily Living No help needed   Instrumental Activities of Daily " Living No help needed   Mini Cog Total Score 5   Some recent data might be hidden     A Mini Cog score of 0-2 suggests the possibility of dementia, score of 3-5 suggests no dementia    Identified Health Risks:     The patient was counseled and encouraged to consider modifying their diet and eating habits. She was provided with information on recommended healthy diet options.  Information regarding advance directives (living smith), including where she can download the appropriate form, was provided to the patient via the AVS.        Cryotherapy Procedure Note    Pre-operative Diagnosis: wart    Post-operative Diagnosis:same     Locations: right hand and left thumb    Indications: treatment to erradicate     Procedure Details        Patient informed of risks (permanent scarring, infection, light or dark discoloration, bleeding, infection, weakness, numbness and recurrence of the lesion) and benefits of the procedure and verbal informed consent obtained.    The areas are treated with liquid nitrogen therapy, frozen until ice ball extended 1 mm beyond lesion, allowed to thaw, and treated againx3. The patient tolerated procedure well.  The patient was instructed on post-op care, warned that there may be blister formation, redness and pain. Recommend OTC analgesia as needed for pain.    Condition:  Stable    Complications:  none.    Plan:  1. Instructed to keep the area dry and covered for 24-48h and clean thereafter.  2. Warning signs of infection were reviewed.    3. Recommended that the patient use OTC analgesics as needed for pain.   4. Return in 4 weeks.

## 2021-06-13 NOTE — TELEPHONE ENCOUNTER
Refill Approved    Rx renewed per Medication Renewal Policy. Medication was last renewed on 9/24/20.    Marissa Jacobs, [facility] Triage/Med Refill 12/21/2020     Requested Prescriptions   Pending Prescriptions Disp Refills     levothyroxine (SYNTHROID, LEVOTHROID) 75 MCG tablet 90 tablet 0     Sig: Take 1 tablet (75 mcg total) by mouth daily.       Thyroid Hormones Protocol Passed - 12/18/2020 10:41 AM        Passed - Provider visit in past 12 months or next 3 months     Last office visit with prescriber/PCP: 3/16/2017 Leona He DO OR same dept: Visit date not found OR same specialty: 6/12/2019 Susannah Vargas, CNP  Last physical: 11/30/2020 Last MTM visit: Visit date not found   Next visit within 3 mo: Visit date not found  Next physical within 3 mo: Visit date not found  Prescriber OR PCP: Leona He DO  Last diagnosis associated with med order: 1. Hypothyroidism  - levothyroxine (SYNTHROID, LEVOTHROID) 75 MCG tablet; Take 1 tablet (75 mcg total) by mouth daily.  Dispense: 90 tablet; Refill: 0    If protocol passes may refill for 12 months if within 3 months of last provider visit (or a total of 15 months).             Passed - TSH on file in past 12 months for patient age 12 & older     TSH   Date Value Ref Range Status   10/12/2020 1.58 0.30 - 5.00 uIU/mL Final

## 2021-06-13 NOTE — PROGRESS NOTES
"Prolia Injection Phone Screen      Screening questions have been asked 2-3 days prior to administration visit for Prolia. If any questions are answered with \"Yes,\" this phone encounter were will routed to ordering provider for further evaluation.     1.  When was the last injection?  06/16/2020    2.  Has insurance for this injection been verified?  Yes    3.  Did you experience any new onset achiness or rashes that lasted for over a month with your previous Prolia injection?   No    4.  Do you have a fever over 101?F or a new deep cough that is unusual for you today? No    5.  Have you started any new medications in the last 6 months that you were told could affect your immune system? These may have been prescribed by oncologist, transplant, rheumatology, or dermatology.   No    6.  In the last 6 months have you have gastric bypass or parathyroid surgery?   No    7.  Do you plan dental work requiring drilling into the bone such as implants/extractions or oral surgery in the next 2-3 months?   No    8. Do you have new insurance since the last injection? No    Patient informed if symptoms discussed above present prior to their administration appointment, they are to notify clinic immediately.     Carmen Garrido            The following steps were completed to comply with the REMS program for Prolia:  1. Ordering provider has previously reviewed information in the Medication Guide and Patient Counseling Chart, including the serious risks of Prolia  and the symptoms of each risk and have been advised to seek prompt medical attention if they have signs or symptoms of any of the serious risks.  2. Provided each patient a copy of the Medication Guide and Patient Brochure.  See MAR for administration details.   Indication: Prolia  (denosumab) is a prescription medicine used to treat osteoporosis in patients who:   Are at high risk for fracture, meaning patients who have had a fracture related to osteoporosis, or who have " multiple risk factors for fracture; Cannot use another osteoporosis medicine or other osteoporosis medicines did not work well.   The timeline for early/late injections would be 4 weeks early and any time after the 6 month edwardo. If a patient receives their injection late, then the subsequent injection would be 6 months from the date that they actually received the injection    Have the screening questions been asked prior to this administration? Yes

## 2021-06-13 NOTE — PROGRESS NOTES
"  ASSESSMENT/PLAN  1. Persistent headaches  Etiology of headaches not clear but based on history we will do trial of nonsedating over-the-counter antihistamine to see if this is helpful.  Patient reassured regarding normal findings on exam and plan for a couple basic lab checks.  If symptoms progress, persist or change would recommend follow-up for further reevaluation.  No\" red flags\" to warrant further intervention or imaging at this time.    2. Vertigo  Transient, will monitor.  Will check basic labs to rule out metabolic issue including CBC and BMP  - Comprehensive Metabolic Panel  - HM2(CBC w/o Differential)    3. Eczema, unspecified type  Okay refill clobetasol as previously prescribed.  Risks/benefits of ongoing use reviewed with patient.  We discussed importance of adequate skin hydration/emollient.    4. Needs flu shot  Seasonal flu shot administered by assistant today.      Pt states an understanding and agrees to the above plan.            SUBJECTIVE:   Chief Complaint   Patient presents with     Headache     c/o frequent headaches X1 month, waking up with the headaches, has had dizziness, yesterday did have vomiting with headache      Lynette Tai 62 y.o. female    Current Outpatient Prescriptions   Medication Sig Dispense Refill     cholecalciferol, vitamin D3, 5,000 unit Tab Take 1 tablet by mouth daily.       levothyroxine (SYNTHROID, LEVOTHROID) 75 MCG tablet TAKE 1 TABLET BY MOUTH EVERY DAY 90 tablet 3     multivitamin with minerals (THERA-M) 9 mg iron-400 mcg Tab tablet Take 1 tablet by mouth daily.       simvastatin (ZOCOR) 10 MG tablet Take 1 tab daily 90 tablet 2     clobetasol (TEMOVATE) 0.05 % cream Apply 1 application topically 2 (two) times a day. 30 g 1     Current Facility-Administered Medications   Medication Dose Route Frequency Provider Last Rate Last Dose     denosumab 60 mg (PROLIA 60 mg/ml)  60 mg Subcutaneous Q6 Months Adeline Mahoney MD   60 mg at 02/04/16 1210 "     Allergies: Codeine; Hydrocodone-acetaminophen; Other environmental allergy; and Sulfa (sulfonamide antibiotics)   No LMP recorded. Patient is postmenopausal.    HPI:   Nancy is a 62-year-old female previously unknown to me who sees Dr. He.  She presents today with complaints of frequent headaches over the last month. She has occasionally been waking up with headaches and yesterday did have one episode of vomiting with her headache but has not had this previously.  She will also intermittently have some lightheaded or vertiginous symptoms that are transient--lasting only a few seconds and seemed to be aggravated by position change.  She reports no recent upper respiratory illness or other sickness, no recent travel, no accidents or head injury.  Patient without history of chronic headaches.  She routinely takes levothyroxine and has prescription for simvastatin which she takes a bit more irregularly.  She has multivitamin and vitamin D which she takes also somewhat irregularly.  No other new medications or supplements.  Patient had labs checked in August for her thyroid which was normal as well as labs in March of this year--normal vitamin D, mildly elevated cholesterol.  Patient reports she had been staying at a relatives house who had dogs and thought initially symptoms may have been related to allergies.  She has no history of routinely using allergy medications but has environmental allergies noted on her allergy list.  She denies significant nasal irritation symptoms or rhinorrhea.  She has noted some mild eye irritation.  Patient does wear glasses but notes these are not new/no recent change in prescription.  Current headaches are primarily frontal and around her eyes do occasionally will be posterior head area.  Patient has tried treating headache once with a dose of Tylenol and another time with a dose of ibuprofen.  Each time, symptoms were noted to improve somewhat.  Patient states headaches are  not limiting her activity, not worsened with exercise.  She is made no significant diet changes and reports eating routinely.    Nancy also requests refill today of her clobetasol which she uses for eczema.  She has had a couple dry patches on her back which seemed to respond to this but she is currently out.  She is previously been seen by dermatology.      ROS: negative except as per HPI    OBJECTIVE:   The patient appears well, alert, oriented x 3, in no distress.  /70 (Patient Site: Left Arm, Patient Position: Sitting, Cuff Size: Adult Regular)  Pulse 92  Temp 98.3  F (36.8  C) (Oral)   Resp 16  Wt 126 lb (57.2 kg)  BMI 21.46 kg/m2    HEENT:  Nose/mouth moist, no erythema/lesions. Neck supple. No adenopathy or thyromegaly.   Ears: TM's appear normal bilaterally w/o fluid or erythema--- slight retraction of TM noted bilaterally  Lungs: clear, good air entry, no wheezes, rhonchi or rales.   Cardiac: S1 and S2 normal, no murmurs, regular rate and rhythm.   Extremities: show no edema, normal peripheral pulses.   Neurological: normal, no focal findings.  Skin: clear, dry, small patches of redness on upper back with some flaking.  Significant excoriation of a couple lesions on her upper back without evidence for secondary infection.

## 2021-06-15 NOTE — TELEPHONE ENCOUNTER
Reason for Call:  Medication or medication refill:    Do you use a [facility]?  Name of the pharmacy and phone number for the current request: [id number] [place] [address] - p) [phone]    Name of the medication requested: clobetasol (TEMOVATE) 0.05 % cream    Other request: Patient called stating she is in [place] and is out of the cream. Her eczema is flaring out and needs a new Rx to the pharmacy in [place].     Can we leave a detailed message on this number? Yes    Phone number patient can be reached at:   Cell number on file:    Telephone Information:   Mobile [phone]       Best Time: Anytime    Call taken on 2/24/2021 at 12:51 PM by Dayan Levine

## 2021-06-16 PROBLEM — K21.9 GERD (GASTROESOPHAGEAL REFLUX DISEASE): Status: ACTIVE | Noted: 2018-07-13

## 2021-06-16 PROBLEM — R51.9 FREQUENT HEADACHES: Status: ACTIVE | Noted: 2018-07-22

## 2021-06-17 ENCOUNTER — OFFICE VISIT - HEALTHEAST (OUTPATIENT)
Dept: INTERNAL MEDICINE | Facility: CLINIC | Age: 66
End: 2021-06-17

## 2021-06-17 ENCOUNTER — HOSPITAL ENCOUNTER (OUTPATIENT)
Dept: MAMMOGRAPHY | Facility: CLINIC | Age: 66
Discharge: HOME | End: 2021-06-17
Attending: FAMILY MEDICINE
Payer: COMMERCIAL

## 2021-06-17 DIAGNOSIS — E03.9 HYPOTHYROIDISM, UNSPECIFIED TYPE: ICD-10-CM

## 2021-06-17 DIAGNOSIS — M81.0 OSTEOPOROSIS WITHOUT CURRENT PATHOLOGICAL FRACTURE, UNSPECIFIED OSTEOPOROSIS TYPE: ICD-10-CM

## 2021-06-17 DIAGNOSIS — Z12.31 ENCOUNTER FOR SCREENING MAMMOGRAM FOR BREAST CANCER: ICD-10-CM

## 2021-06-17 NOTE — PROGRESS NOTES
Assessment & Plan     Restless leg syndrome  Patient has symptoms of restless leg by recently had labs that were normal for iron studies.  We discussed with her prescription medication she could consider but otherwise she is interested in trialing the magnesium at bedtime and increasing water during the day and stretching first    Rash and nonspecific skin eruption  Lesions on mid back have been biopsied without any concerning findings.  She will continue use the steroid cream as needed    Osteoporosis without current pathological fracture, unspecified osteoporosis type  -Continues on Prolia injections.  Difficult to say if that is a correlation or cause of her itching.  For now she would like to stay on that medication.  We typically would check a DEXA scan every couple years.  Hard to say finding benefit after 10 years of treatment.    Viral warts, unspecified type  3 lesions were frozen today off of patient's hands.  Please see procedure note    Foul smelling urine  Urinalysis today is normal.  Could be vaginal coming from postmenopausal changes of vaginal wall sloughing versus bacterial vaginosis.  She is comfortable with monitoring for now and increasing water intake and is not interested in pelvic exam today  - Urinalysis-UC if Indicated    Familial hypercholesterolemia  Tolerating statin therapy.  Labs being repeated today to make sure improvement and also follow-up on liver enzymes.  - Lipid Cascade FASTING  - Hepatic Profile    Patient instruction-   -could trial magnesium at bedtime to see if helps with restless legs. Increase water during the day and stretching before bed    -recommend 5000IU  daily of vitamin D     Mammogram  [phone]     Consider applying mediplast bandage and change few da  ys         30 minutes spent on the date of the encounter doing chart review, history and exam, documentation and further activities per the note       Return for Annual physical.    DO BERTRAM Arguello  HEALTH [facility]    Subjective   Lynette Tai is 65 y.o. and presents today for the following health issues   HPI     Was in [place] over winter where had vaccines. Has been healthy.   A lot of times urine will smell. Is it because getting older? No vaginal discharge. Sour. Not fishy odor.      Back to taking rosuvastatin again  Due to LDL 190s. No CAD.   Tolerating well. No cramps.  Strong family history of hypercholesterolemia.    Still gets evening restlessness in legs. Calves get feeling like a tingling burning   39 day stretch on pelaton .  She thinks it is getting better the more she exercises and stretches.    Saw derm in [place]-spot that itches on mid back.  Been there since prolia. Any day that thinks about it then comes on.  Was also told to take the steroid cream that we had given her.    Otherwise feels fairly healthy.  Has a couple of warts on her hands that we previously had frozen and a new one that is developing.  She did not do the Mediplast bandages and was gone so could not follow-up    Wt Readings from Last 3 Encounters:   05/11/21 129 lb 6.4 oz (58.7 kg)   11/30/20 132 lb 9.6 oz (60.1 kg)   06/16/20 131 lb 12.8 oz (59.8 kg)             Review of Systems  Review of Systems  Complete ROS reviewed in HPI or otherwise negative        Objective    /71   Pulse 76   Temp 98.3  F (36.8  C) (Oral)   Resp 20   Wt 129 lb 6.4 oz (58.7 kg)   SpO2 97%   BMI 21.87 kg/m    Body mass index is 21.87 kg/m .  Physical Exam  General impression no acute distress  Heart regular rate and rhythm  Lungs clear to auscultation bilateral  Skin with 2 lesions mid back that appear mildly excoriated  There is 1 wart on dorsal thumb and 1 larger wart on index finger on the left as well as a smaller 1 proximal    Cryotherapy Procedure Note    Pre-operative Diagnosis: Warts    Post-operative Diagnosis: Same    Locations: Right thumb and to arm index finger on the  left    Indications: Eradication and prevent spread        Procedure Details   History of allergy to iodine: no.  Pacemaker? no.    Patient informed of risks (permanent scarring, infection, light or dark discoloration, bleeding, infection, weakness, numbness and recurrence of the lesion) and benefits of the procedure and verbal informed consent obtained.    The areas are treated with liquid nitrogen therapy, frozen until ice ball extended 1 mm beyond lesion, allowed to thaw, and treated again.  3 lesions treated the patient tolerated procedure well.  The patient was instructed on post-op care, warned that there may be blister formation, redness and pain. Recommend OTC analgesia as needed for pain.    Condition:  Stable    Complications:  none    Plan:  1. Instructed to keep the area dry and covered for 24-48h and clean thereafter.  2. Warning signs of infection were reviewed.    3. Recommended that the patient use OTC analgesics as needed for pain.   4.   Patient was advised to message me if wart persists so that we can schedule her for repeat treatment

## 2021-06-17 NOTE — PATIENT INSTRUCTIONS - HE
-could trial magnesium at bedtime to see if helps with restless legs. Increase water during the day and stretching before bed    -recommend 5000IU  daily of vitamin D     Mammogram  [phone]     Consider applying mediplast bandage and change few da  ys

## 2021-06-18 LAB — 25(OH)D3 SERPL-MCNC: 31.8 NG/ML (ref 30–80)

## 2021-06-18 NOTE — PATIENT INSTRUCTIONS - HE
Patient Instructions by Leona He DO at 11/30/2020 11:20 AM     Author: Leona He DO Service: -- Author Type: Physician    Filed: 11/30/2020 11:53 AM Encounter Date: 11/30/2020 Status: Signed    : Leona He DO (Physician)         Patient Education   Understanding USDA MyPlate  The USDA (US Department of Agriculture) has guidelines to help you make healthy food choices. These are called MyPlate. MyPlate shows the food groups that make up healthy meals using the image of a place setting. Before you eat, think about the healthiest choices for what to put onto your plate or into your cup or bowl. To learn more about building a healthy plate, visit www.choosemyplate.gov.       The Food Groups    Fruits: Any fruit or 100% fruit juice counts as part of the Fruit Group. Fruits may be fresh, canned, frozen, or dried, and may be whole, cut-up, or pureed. Make half your plate fruits and vegetables.    Vegetables: Any vegetable or 100% vegetable juice counts as a member of the Vegetable Group. Vegetables may be fresh, frozen, canned, or dried. They can be served raw or cooked and may be whole, cut-up, or mashed. Make half your plate fruits and vegetables.     Grains: All foods made from grains are part of the Grains Group. These include wheat, rice, oats, cornmeal, and barley such as bread, pasta, oatmeal, cereal, tortillas, and grits. Grains should be no more than a quarter of your plate. At least half of your grains should be whole grains.    Protein: This group includes meat, poultry, seafood, beans and peas, eggs, processed soy products (like tofu), nuts (including nut butters), and seeds. Make protein choices no more than a quarter of your plate. Meat and poultry choices should be lean or low fat.    Dairy: All fluid milk products and foods made from milk that contain calcium, like yogurt and cheese are part of the Dairy Group. (Foods that have little calcium, such as cream, butter,  and cream cheese, are not part of the group.) Most dairy choices should be low-fat or fat-free.    Oils: These are fats that are liquid at room temperature. They include canola, corn, olive, soybean, and sunflower oil. Foods that are mainly oil include mayonnaise, certain salad dressings, and soft margarines. You should have only 5 to 7 teaspoons of oils a day. You probably already get this much from the food you eat.  Use Divesquareer to Help Build Your Meals  The [employer] can help you plan and track your meals and activity. You can look up individual foods to see or compare their nutritional value. You can get guidelines for what and how much you should eat. You can compare your food choices. And you can assess personal physical activities and see ways you can improve. Go to www.Microinox.gov/Chimerixcker/.    5720-5664 [employer]. [address] [place] [address]. All rights reserved. This information is not intended as a substitute for professional medical care. Always follow your healthcare professional's instructions.           Patient Education   Understanding Advance Care Planning  Advance care planning is the process of deciding ones own future medical care. It helps ensure that if you cant speak for yourself, your wishes can still be carried out. The plan is a series of legal documents that note a persons wishes. The documents vary by state. Advance care planning may be done when a person has a serious illness that is expected to get worse. It may be done before major surgery. And it can help you and your family be prepared in case of a major illness or injury. Advance care planning helps with making decisions at these times.       A health care proxy is a person who acts as the voice of a patient when the patient cant speak for himself or herself. The name of this role varies by state. It may be called a Durable Medical Power of  or Durable Power of  for  Healthcare. It may be called an agent, surrogate, or advocate. Or it may be called a representative or decision maker. It is an official duty that is identified by a legal document. The document also varies by state.    Why Is Advance Care Planning Important?  If a person communicates their healthcare wishes:    They will be given medical care that matches their values and goals.    Their family members will not be forced to make decisions in a crisis with no guidance.  Creating a Plan  Making an advance care plan is often done in 3 steps:    Thinking about ones wishes. To create an advance care plan, you should think about what kind of medical treatment you would want if you lose the ability to communicate. Are there any situations in which you would refuse or stop treatment? Are there therapies you would want or not want? And whom do you want to make decisions for you? There are many places to learn more about how to plan for your care. Ask your doctor or  for resources.    Picking a health care proxy. This means choosing a trusted person to speak for you only when you cant speak for yourself. When you cannot make medical decisions, your proxy makes sure the instructions in your advance care plan are followed. A proxy does not make decisions based on his or her own opinions. They must put aside those opinions and values if needed, and carry out your wishes.    Filling out the legal documents. There are several kinds of legal documents for advance care planning. Each one tells health care providers your wishes. The documents may vary by state. They must be signed and may need to be witnessed or notarized. You can cancel or change them whenever you wish. Depending on your state, the documents may include a Healthcare Proxy form, Living Will, Durable Medical Power of , Advance Directive, or others.  The Familys Role  The best help a family can give is to support their loved ones wishes. Open and  honest communication is vital. Family should express any concerns they have about the patients choices while the patient can still make decisions.    7494-4567 [employer]. [address] [place] [address], [place] [address]. All rights reserved. This information is not intended as a substitute for professional medical care. Always follow your healthcare professional's instructions.         Also, [facility] offers a free, downloadable health care directive that allows you to share your treatment choices and personal preferences if you cannot communicate your wishes. It also allows you to appoint another person (called a health care agent) to make health care decisions if you are unable to do so. You can download an advance directive by going here: [email or web address]     Patient Education   Personalized Prevention Plan  You are due for the preventive services outlined below.  Your care team is available to assist you in scheduling these services.  If you have already completed any of these items, please share that information with your care team to update in your medical record.  Health Maintenance   Topic Date Due   ? HIV SCREENING  07/26/1970   ? ADVANCE CARE PLANNING  07/26/1973   ? ZOSTER VACCINES (2 of 3) 05/11/2017   ? Pneumococcal Vaccine: 65+ Years (1 of 1 - PPSV23) 07/26/2020   ? MAMMOGRAM  07/26/2021   ? MEDICARE ANNUAL WELLNESS VISIT  11/30/2021   ? FALL RISK ASSESSMENT  11/30/2021   ? LIPID  10/12/2025   ? COLORECTAL CANCER SCREENING  11/04/2025   ? TD 18+ HE  07/29/2029   ? HEPATITIS C SCREENING  Completed   ? INFLUENZA VACCINE RULE BASED  Completed   ? TDAP ADULT ONE TIME DOSE  Completed   ? Pneumococcal Vaccine: Pediatrics (0 to 5 Years) and At-Risk Patients (6 to 64 Years)  Aged Out   ? HEPATITIS B VACCINES  Aged Out

## 2021-06-19 NOTE — PROGRESS NOTES
FEMALE ADULT PREVENTIVE EXAM    CHIEF COMPLAINT:  Female preventive exam.    SUBJECTIVE:  Lynette Tai is a 62 y.o. female.  She last saw me Visit date not found.  Patient is being seen today for annual physical.  Overall health has been fairly stable except she has been noticing an increase in headaches more recently.  Does not wake up with headaches and she has not noticed any changes in vision, nausea vomiting or ataxia.  Seems to be more tension related headaches and does admit that maybe she does not drink enough fluids.  She is going to pay attention to this more and keep a little bit of a headache diary.  Typically does resolve with OTC medications.  They are not migraine like.  She has a history of hypothyroidism and due for labs.  Also more recently has been dealing with some gastroesophageal reflux symptoms and we discussed that in detail as well as strategies that she can use to help lower the potential for reflux symptoms.  We discussed using omeprazole daily to see if that helps with symptoms.  We decided to get an EKG today due to family history to ensure that they are not cardiac symptoms being masked as GI symptoms.  She has osteoporosis and is following with a specialist for Prolia injections  She has a history of polyps and a family member and therefore is on an every five-year screening.  She is due for colonoscopy this year.  He also is on simvastatin 10 mg daily and so is interested in having lipids checked today.  She also has been complaining intermittently of hot flashes that seem to be more bothersome lately  She  has a past medical history of Intermittent asthma; Intermittent asthma; and Osteoporosis.    Lab Results   Component Value Date    WBC 5.4 10/04/2017    HGB 13.2 10/04/2017    HCT 39.2 10/04/2017    MCV 91 10/04/2017     10/04/2017     07/13/2018    K 5.0 07/13/2018    BUN 19 07/13/2018     Lab Results   Component Value Date    CHOL 246 (H) 07/13/2018    HDL  72 07/13/2018    LDLCALC 158 (H) 07/13/2018    TRIG 78 07/13/2018     Lab Results   Component Value Date    TSH 0.38 07/13/2018     BP Readings from Last 3 Encounters:   07/13/18 106/68   10/04/17 110/70   08/18/17 112/60       Surgeries:    Past Surgical History:   Procedure Laterality Date     appendix      10/1972     CHOLECYSTECTOMY      5/1986     OVARIAN CYST REMOVAL      10/1972     OR OSTEOTOMY CLAVICLE      Description: Osteotomy Of The Clavicle;  Recorded: 02/22/2012;       Family History:  Her family history includes Breast cancer in her mother; Coronary artery disease in her father and mother; Lung cancer in her sister; Osteoporosis in her mother; Prostate cancer in her father; Sleep apnea in her brother.    Social History:  She  reports that she has never smoked. She has never used smokeless tobacco. She reports that she drinks alcohol.    Medications:    Current Outpatient Prescriptions:      cholecalciferol, vitamin D3, 5,000 unit Tab, Take 1 tablet by mouth daily., Disp: , Rfl:      clobetasol (TEMOVATE) 0.05 % cream, Apply 1 application topically 2 (two) times a day., Disp: 30 g, Rfl: 1     levothyroxine (SYNTHROID, LEVOTHROID) 75 MCG tablet, TAKE 1 TABLET BY MOUTH EVERY DAY, Disp: 90 tablet, Rfl: 3     multivitamin with minerals (THERA-M) 9 mg iron-400 mcg Tab tablet, Take 1 tablet by mouth daily., Disp: , Rfl:      omeprazole (PRILOSEC) 20 MG capsule, Take 1 capsule (20 mg total) by mouth daily before breakfast., Disp: 30 capsule, Rfl: 2     simvastatin (ZOCOR) 20 MG tablet, Take 1 tablet (20 mg total) by mouth every evening., Disp: 90 tablet, Rfl: 4    Current Facility-Administered Medications:      denosumab 60 mg (PROLIA 60 mg/ml), 60 mg, Subcutaneous, Q6 Months, Adeline Mahoney MD, 60 mg at 06/07/18 1027  HELD MEDICATIONS: None.    Allergies:  No latex allergies.  Allergies   Allergen Reactions     Codeine      Nausea      Hydrocodone-Acetaminophen      nausesa      Other Environmental  Allergy      Sulfa (Sulfonamide Antibiotics)      Itchy eyes        RISK BEHAVIOR & HEALTH HABITS  History of abnormal pap smear: none.  Date of previous pap smear: up to date .  Mammography: due.  Self Breast Exam: no.  Colon/Flex Sig: due reviewed from 2013.  DEXA: managed by specialist .   Regular Exercise: 3 x per week .  Balanced diet: 3 meals a day .  Seat Belt Use: yes.  Calcium intake/Osteoporosis prevention: yes.    Guns: NO  Sun Screen: YES  Dental Care: YES    REVIEW OF SYSTEMS:  Complete head to toe review of systems is otherwise negative except as above.    OBJECTIVE:  VITAL SIGNS:    Vitals:    07/13/18 1005   BP: 106/68   Pulse: 68   Resp: 16   Temp: 97.8  F (36.6  C)     GENERAL:  Patient alert, in no acute distress.  EYES: PERRLA. Extraoccular movements intact, pupils equal, reactive to light and accommodation.  Normal conjunctiva and lids.  Undilated fudoscopic exam normal, including normal size, appearance cup-to-disc ratio.  Normal posterior segments, including no exudates or hemorrhages.  ENT:  Hearing grossly normal.  Normal appearance to ears and nose.  Bilateral TM s, external canals, oropharynx normal. Normal lips, gums and teeth.  Normal nasal mucosa, septum and turbinates.  NECK:  Supple, without thyromegaly or mass.  RESP:  Clear to auscultation without crackles, wheezes or distress.  Normal respiratory effort.   CV:  Regular rate and rhythm without murmurs, rubs or gallops.  Normal carotid, abdominal aorta, femoral and pedal pulses.  No varicosities or edema.  ABDOMEN:  Soft, non-tender, without hepatosplenomegaly, masses, or hernias.   BREASTS:  Nontender, without masses, nipple discharge, erythema, or axillary adenopathy.    :  Normal pelvic exam, including external genitalia with normal appearance and no lesions.  Normal vaginal exam, including no discharge and normal pelvic support, and no lesions.   Normal ureters, with no masses, tenerness or scarring.  Normal bladder, with no  fullness, masses or tenderness.  Cervix well visualized, with normal appearance and no lesions or discharge.  Normal uterus, including normal size, shape, mobility with no tenderness.  Normal adnexa, including no nodules, masses or tenderness.  LYMPHATIC: Normal palpation of neck, groin and axilla..  No lymphadenopathy.  No bruising.  NEURO:  CN II-XII intact, motor & sensory function all intact.  DTR and reflexes normal.  PSYCHIATRIC:  Alert & oriented with normal mood and affect.  Good judgment and insight.  SKIN:  Normal inspection and palpation.  MUSCULOSKELETAL: Normal gait and station.  - Spine / Ribs / Pelvis: Normal inspection, ROM, stability and strength: Spine, Head, Neck, Upper and Lower Extremities.    ASSESSMENT & PLAN  Lynette was seen today for annual exam.    Diagnoses and all orders for this visit:    Well woman exam  -     Comprehensive Metabolic Panel  -     Gynecologic Cytology (PAP Smear)  -     HPV High Risk DNA Cervical    Visit for screening mammogram  -     Mammo Screening Bilateral; Future    Screen for colon cancer  -     Ambulatory referral for Colonoscopy    GERD (gastroesophageal reflux disease)  -     Electrocardiogram Perform and Read    Hypothyroidism  -     Thyroid Cascade    Osteoporosis    Hyperlipidemia  -     Lipid Cascade FASTING    Frequent headaches    Menopausal syndrome (hot flashes)    Other orders  -     omeprazole (PRILOSEC) 20 MG capsule; Take 1 capsule (20 mg total) by mouth daily before breakfast.  -     simvastatin (ZOCOR) 20 MG tablet; Take 1 tablet (20 mg total) by mouth every evening.    Common acid reflux triggers include anything with citrus, tomato, garlic, onion, spicy foods, peppermint, coffee, soda, tea, and chocolate.   Try -    - eating smaller meals throughout the day instead of a few large meals   - wait 2-3 hours after eating before laying down or going to sleep   - elevate the head of your bed by 4-6 inches using blocks   - avoid tight clothes and  tight belts   - if relevant, quit smoking and lose weight    -consider trying the prilosec for 1-2 months.  If doing well we can try weaning off    -for hot flashes consider vitamin E 400IU once daily    -She is going to keep a headache diary and also try to keep fluids increased to see if this helps.  No indication for imaging yet at this time.  -EKG normal today shows normal sinus rhythm ventricular rate of 69 with no ST segment changes  -Increased dose of simvastatin to 20 mg daily due to LDL  General  Immunizations reviewed and updated .  Alcohol use, safety and moderation discussed.  Recommended adequate calcium intake/osteoporosis prevention.  Discussed colon cancer screening at age 50, 45 if -American.  Diet and exercise reviewed, including goal of aerobic exercise 30-90 minutes most days of the week, moderation of portions sizes, avoiding eating out and fast food and increase in fruits and vegetables.  Discussed safe sex practices.  Discussed & recommended seat belt (& motorcycle helmet) use.  Discussed & recommended smoke detector.  Discussed sun protection.  Discussed weight management.     [Negative] : Genitourinary

## 2021-06-20 ENCOUNTER — HEALTH MAINTENANCE LETTER (OUTPATIENT)
Age: 66
End: 2021-06-20

## 2021-06-26 NOTE — PATIENT INSTRUCTIONS - HE
Prolia 11th today.  Prolia 12th in 6 months with my nurse. I will see you in 1 year.    DXA in 2 years.   Phone number to schedule [phone].    Daily calcium need is 4147-5940 mg a day from the diet and supplements.  Calcium citrate is easier to digest.  Vitamin D 2000 IU daily recommended.

## 2021-06-26 NOTE — PROGRESS NOTES
1. Osteoporosis without current pathological fracture, unspecified osteoporosis type  Vitamin D, Total (25-Hydroxy)   2. Hypothyroidism, unspecified type       The following steps were completed to comply with the REMS program for Prolia:    Reviewed information in the Medication Guide and Patient Counseling Chart, including the serious risks of Prolia  and the symptoms of each risk.    Advised patient to seek prompt medical attention if they have signs or symptoms of any of the serious risks.    Provided each patient a copy of the Medication Guide and Patient Brochure    Return in about 6 months (around 12/17/2021) for Prolia injection.    Patient Instructions   Prolia 11th today.  Prolia 12th in 6 months with my nurse. I will see you in 1 year.    DXA in 2 years.   Phone number to schedule [phone].    Daily calcium need is 2133-2972 mg a day from the diet and supplements.  Calcium citrate is easier to digest.  Vitamin D 2000 IU daily recommended.      Chief Complaint   Patient presents with     Osteoporosis Follow Up     Osteo F/U       /69   Pulse 75   Wt 131 lb 4.8 oz (59.6 kg)   SpO2 98%   BMI 22.19 kg/m        Did you experience any problems with previous Prolia injection? no  Any medication change in the last 6 months? no  Did you take prednisone or other immunosupressant drugs in the last 6 months   (chemo, transplant, rheum, dermatology conditions)? no  Did you have any serious infection in the last 6 months?no  Any recent hospitalizations?no  Do you plan any dental work in the next 2-3 months?no  How much calcium do you take daily from the diet and supplements?1200 mg  How much vit D do you take daily? 2000 IU  Last DXA? 6/7/21, Reviewed and discussed      Patient is here today for the 11th Prolia injection. Patient tolerated previous injections well.   We discussed calcium and vit D daily needs today.   I reviewed the lab results:    Component      Latest Ref Rng & Units 10/12/2020    Vitamin D, Total (25-Hydroxy)      30.0 - 80.0 ng/mL 25.7 (L)     Ref Range & Units 10/12/20 1017    Sodium 136 - 145 mmol/L 138    Potassium 3.5 - 5.0 mmol/L 4.2    Chloride 98 - 107 mmol/L 102    Carbon Dioxide (CO2) 22 - 31 mmol/L 26    Anion Gap 5 - 18 mmol/L 10    Glucose 70 - 125 mg/dL 95    Urea Nitrogen 8 - 22 mg/dL 21    Creatinine 0.60 - 1.10 mg/dL 0.92    GFR Estimate If Black     GFR Estimate     Bilirubin Total 0.0 - 1.0 mg/dL 0.7    Calcium 8.5 - 10.5 mg/dL 9.8    Protein Total 6.0 - 8.0 g/dL 7.5            Next Prolia injection will be in 6 months.     Patient was educated on safety of Prolia utilizing Patient Counseling Chart for Healthcare Providers, as outlined by the Prolia REMS progam.         This note has been dictated using voice recognition software. Any grammatical or context distortions are unintentional and inherent to the software      Patient Active Problem List   Diagnosis     Hypothyroidism     Osteoporosis     Hyperlipidemia     GERD (gastroesophageal reflux disease)     Frequent headaches       Current Outpatient Medications on File Prior to Visit   Medication Sig Dispense Refill     calcium carb and citrat-mag ox 200 mg calcium- 50 mg Tab Take by mouth.       cholecalciferol, vitamin D3, 5,000 unit Tab Take 1 tablet by mouth daily.       clobetasoL (TEMOVATE) 0.05 % cream Apply 1 application topically 2 (two) times a day. 30 g 1     levothyroxine (SYNTHROID, LEVOTHROID) 75 MCG tablet Take 1 tablet (75 mcg total) by mouth daily. 90 tablet 3     multivitamin with minerals (THERA-M) 9 mg iron-400 mcg Tab tablet Take 1 tablet by mouth daily.       rosuvastatin (CRESTOR) 5 MG tablet Take 1 tablet (5 mg total) by mouth at bedtime. For cholesterol 90 tablet 4     Current Facility-Administered Medications on File Prior to Visit   Medication Dose Route Frequency Provider Last Rate Last Admin     denosumab 60 mg (PROLIA 60 mg/ml)  60 mg Subcutaneous Q6 Months Julius Michael MD   60 mg at  06/17/21 1108     [START ON 6/25/2021] denosumab 60 mg (PROLIA 60 mg/ml)  60 mg Subcutaneous Q6 Months Julius Michael MD

## 2021-07-03 NOTE — ADDENDUM NOTE
Addendum Note by Bloch, Lisa M, CMA at 8/18/2017  1:29 PM     Author: Bloch, Lisa M, CMA Service: -- Author Type: Certified Medical Assistant    Filed: 8/18/2017  1:29 PM Encounter Date: 8/18/2017 Status: Signed    : Bloch, Lisa M, CMA (Certified Medical Assistant)    Addended by: BLOCH, LISA M on: 8/18/2017 01:29 PM        Modules accepted: Orders

## 2021-07-06 VITALS
WEIGHT: 131.3 LBS | BODY MASS INDEX: 22.19 KG/M2 | SYSTOLIC BLOOD PRESSURE: 118 MMHG | HEART RATE: 75 BPM | OXYGEN SATURATION: 98 % | DIASTOLIC BLOOD PRESSURE: 69 MMHG

## 2021-07-13 ENCOUNTER — RECORDS - HEALTHEAST (OUTPATIENT)
Dept: ADMINISTRATIVE | Facility: CLINIC | Age: 66
End: 2021-07-13

## 2021-10-11 ENCOUNTER — HEALTH MAINTENANCE LETTER (OUTPATIENT)
Age: 66
End: 2021-10-11

## 2021-11-18 DIAGNOSIS — E78.5 HYPERLIPIDEMIA, UNSPECIFIED HYPERLIPIDEMIA TYPE: ICD-10-CM

## 2021-11-19 ENCOUNTER — TRANSCRIBE ORDERS (OUTPATIENT)
Dept: INTERNAL MEDICINE | Facility: CLINIC | Age: 66
End: 2021-11-19
Payer: COMMERCIAL

## 2021-11-19 DIAGNOSIS — K21.9 GASTROESOPHAGEAL REFLUX DISEASE WITHOUT ESOPHAGITIS: Primary | ICD-10-CM

## 2021-11-19 DIAGNOSIS — M81.0 AGE RELATED OSTEOPOROSIS, UNSPECIFIED PATHOLOGICAL FRACTURE PRESENCE: ICD-10-CM

## 2021-11-22 RX ORDER — ROSUVASTATIN CALCIUM 5 MG/1
TABLET, COATED ORAL
Qty: 90 TABLET | Refills: 4 | OUTPATIENT
Start: 2021-11-22

## 2021-12-20 ENCOUNTER — ALLIED HEALTH/NURSE VISIT (OUTPATIENT)
Dept: FAMILY MEDICINE | Facility: CLINIC | Age: 66
End: 2021-12-20
Payer: COMMERCIAL

## 2021-12-20 DIAGNOSIS — M81.0 OSTEOPOROSIS: Primary | ICD-10-CM

## 2021-12-20 PROCEDURE — 99207 PR NO CHARGE NURSE ONLY: CPT

## 2021-12-20 PROCEDURE — 96372 THER/PROPH/DIAG INJ SC/IM: CPT | Performed by: INTERNAL MEDICINE

## 2021-12-20 NOTE — PROGRESS NOTES
"The following steps were completed to comply with the REMS program for Prolia:  1. Ordering provider has previously reviewed information in the Medication Guide and Patient Counseling Chart, including the serious risks of Prolia  and the symptoms of each risk and have been advised to seek prompt medical attention if they have signs or symptoms of any of the serious risks.  2. Provided each patient a copy of the Medication Guide and Patient Brochure.  See MAR for administration details.   Indication: Prolia  (denosumab) is a prescription medicine used to treat osteoporosis in patients who:   Are at high risk for fracture, meaning patients who have had a fracture related to osteoporosis, or who have multiple risk factors for fracture; Cannot use another osteoporosis medicine or other osteoporosis medicines did not work well.   The timeline for early/late injections would be 4 weeks early and any time after the 6 month edwardo. If a patient receives their injection late, then the subsequent injection would be 6 months from the date that they actually received the injection    Have the screening questions been asked prior to this administration? No    Prolia Injection Phone Screen      Screening questions have been asked 2-3 days prior to administration visit for Prolia. If any questions are answered with \"Yes,\" this phone encounter were will routed to ordering provider for further evaluation.     1.  When was the last injection?  6/17/21    2.  Has insurance for this injection been verified?  Yes    3.  Did you experience any new onset achiness or rashes that lasted for over a month with your previous Prolia injection?   No    4.  Do you have a fever over 101?F or a new deep cough that is unusual for you today? No    5.  Have you started any new medications in the last 6 months that you were told could affect your immune system? These may have been prescribed by oncologist, transplant, rheumatology, or dermatology. "   No    6.  In the last 6 months have you have gastric bypass or parathyroid surgery?   No    7.  Do you plan dental work requiring drilling into the bone such as implants/extractions or oral surgery in the next 2-3 months?   No    8. Do you have new insurance since the last injection? No     9. Have you received the Covid-19 vaccine? Yes  If No - Proceed with Prolia injection  If Yes - Date of vaccination 2/27/21. Will need to wait until 2 weeks after 2nd dose of Covid-19 vaccine before administering Prolia       Patient informed if symptoms discussed above present prior to their administration appointment, they are to notify clinic immediately.     Thu Bennett

## 2022-01-30 ENCOUNTER — HEALTH MAINTENANCE LETTER (OUTPATIENT)
Age: 67
End: 2022-01-30

## 2022-06-02 DIAGNOSIS — M81.0 AGE RELATED OSTEOPOROSIS, UNSPECIFIED PATHOLOGICAL FRACTURE PRESENCE: Primary | ICD-10-CM

## 2022-06-02 DIAGNOSIS — K21.9 GASTROESOPHAGEAL REFLUX DISEASE WITHOUT ESOPHAGITIS: ICD-10-CM

## 2022-06-03 DIAGNOSIS — E78.5 HYPERLIPIDEMIA, UNSPECIFIED HYPERLIPIDEMIA TYPE: ICD-10-CM

## 2022-06-05 RX ORDER — ROSUVASTATIN CALCIUM 5 MG/1
TABLET, COATED ORAL
Qty: 90 TABLET | Refills: 4 | Status: SHIPPED | OUTPATIENT
Start: 2022-06-05 | End: 2022-10-11

## 2022-06-05 NOTE — TELEPHONE ENCOUNTER
"Routing refill request to provider for review/approval because:  Labs not current:  Due to be seen    Last Written Prescription Date:  5/12/21  Last Fill Quantity: 90,  # refills: 4   Last office visit provider:  5/11/21     Requested Prescriptions   Pending Prescriptions Disp Refills     rosuvastatin (CRESTOR) 5 MG tablet [Pharmacy Med Name: ROSUVASTATIN 5MG TABLETS] 90 tablet 4     Sig: TAKE 1 TABLET(5 MG) BY MOUTH AT BEDTIME FOR CHOLESTEROL       Statins Protocol Failed - 6/3/2022  9:28 AM        Failed - LDL on file in past 12 months     Recent Labs   Lab Test 05/11/21  1116                Passed - No abnormal creatine kinase in past 12 months     No lab results found.             Passed - Recent (12 mo) or future (30 days) visit within the authorizing provider's specialty     Patient has had an office visit with the authorizing provider or a provider within the authorizing providers department within the previous 12 mos or has a future within next 30 days. See \"Patient Info\" tab in inbasket, or \"Choose Columns\" in Meds & Orders section of the refill encounter.              Passed - Medication is active on med list        Passed - Patient is age 18 or older        Passed - No active pregnancy on record        Passed - No positive pregnancy test in past 12 months             Marissa Jacobs RN 06/04/22 9:54 PM  "

## 2022-06-20 DIAGNOSIS — E03.9 HYPOTHYROIDISM: ICD-10-CM

## 2022-06-21 RX ORDER — LEVOTHYROXINE SODIUM 75 UG/1
TABLET ORAL
Qty: 90 TABLET | Refills: 0 | Status: SHIPPED | OUTPATIENT
Start: 2022-06-21 | End: 2022-09-16

## 2022-06-21 NOTE — TELEPHONE ENCOUNTER
"Routing refill request to provider for review/approval because:  Labs not current:  TSH  Patient needs to be seen because it has been more than 1 year since last office visit.    Last Written Prescription Date:  12/23/21  Last Fill Quantity: 90,  # refills: 1   Last office visit provider:  6/17/21 - scheduled 6/22/22    Requested Prescriptions   Pending Prescriptions Disp Refills     levothyroxine (SYNTHROID/LEVOTHROID) 75 MCG tablet [Pharmacy Med Name: LEVOTHYROXINE 0.075MG (75MCG) TABS] 90 tablet 1     Sig: TAKE 1 TABLET(75 MCG) BY MOUTH DAILY       Thyroid Protocol Failed - 6/21/2022 12:00 PM        Failed - Normal TSH on file in past 12 months     Recent Labs   Lab Test 10/12/20  1017   TSH 1.58              Passed - Patient is 12 years or older        Passed - Recent (12 mo) or future (30 days) visit within the authorizing provider's specialty     Patient has had an office visit with the authorizing provider or a provider within the authorizing providers department within the previous 12 mos or has a future within next 30 days. See \"Patient Info\" tab in inbasket, or \"Choose Columns\" in Meds & Orders section of the refill encounter.              Passed - Medication is active on med list        Passed - No active pregnancy on record     If patient is pregnant or has had a positive pregnancy test, please check TSH.          Passed - No positive pregnancy test in past 12 months     If patient is pregnant or has had a positive pregnancy test, please check TSH.               Favio Gonzalez RN 06/21/22 12:00 PM  "

## 2022-06-22 ENCOUNTER — OFFICE VISIT (OUTPATIENT)
Dept: INTERNAL MEDICINE | Facility: CLINIC | Age: 67
End: 2022-06-22
Payer: COMMERCIAL

## 2022-06-22 VITALS
OXYGEN SATURATION: 99 % | WEIGHT: 137 LBS | BODY MASS INDEX: 22.82 KG/M2 | HEIGHT: 65 IN | HEART RATE: 71 BPM | SYSTOLIC BLOOD PRESSURE: 110 MMHG | DIASTOLIC BLOOD PRESSURE: 80 MMHG

## 2022-06-22 DIAGNOSIS — M81.8 IDIOPATHIC OSTEOPOROSIS: Primary | ICD-10-CM

## 2022-06-22 DIAGNOSIS — E03.9 ACQUIRED HYPOTHYROIDISM: ICD-10-CM

## 2022-06-22 PROCEDURE — 99214 OFFICE O/P EST MOD 30 MIN: CPT | Performed by: INTERNAL MEDICINE

## 2022-06-22 PROCEDURE — 96372 THER/PROPH/DIAG INJ SC/IM: CPT | Performed by: INTERNAL MEDICINE

## 2022-06-22 NOTE — PATIENT INSTRUCTIONS
Prolia 13th today.  Prolia 14th in 6 months with my nurse. I will see you in 1 year.    DXA in 6/2023 .   Phone number to schedule [phone].    Daily calcium need is 6768-2770 mg a day from the diet and supplements.  Calcium citrate is easier to digest.  Vitamin D 2000 IU daily recommended.       Please check calcium, vit D level and TSH with the next blood draw with your PCP.

## 2022-06-22 NOTE — PROGRESS NOTES
"  (M81.8) Idiopathic osteoporosis  (primary encounter diagnosis)  Comment: stable on Prolia. Due for DXA next year.  Possible switch to Reclast in 2 years.      (E03.9) Acquired hypothyroidism  Comment: on levothyroxine.  Plan: due for TSH check. TSH should be if possible 2-3.       Excessive thyroid hormone causes osteoporosis and fractures. This can occur when hyperthyroid disease goes untreated or patient is taking too much levothyroxine. One study of women with osteoporosis concluded that taking a high dose of thyroid hormone (>150 micrograms/day) increased their fracture risk more than a 50%.             Patient was educated on safety of Prolia utilizing Patient Counseling Chart for Healthcare Providers, as outlined by the Prolia REMS progam.     Return in about 6 months (around 12/22/2022) for Prolia with CSS.    Patient Instructions   Prolia 13th today.  Prolia 14th in 6 months with my nurse. I will see you in 1 year.    DXA in 6/2023 .   Phone number to schedule [phone].    Daily calcium need is 1201-7079 mg a day from the diet and supplements.  Calcium citrate is easier to digest.  Vitamin D 2000 IU daily recommended.       Please check calcium, vit D level and TSH with the next blood draw with your PCP.          /80 (BP Location: Right arm, Patient Position: Sitting)   Pulse 71   Ht 1.638 m (5' 4.5\")   Wt 62.1 kg (137 lb)   SpO2 99%   BMI 23.15 kg/m        Did you experience any problems with previous Prolia injection? no  Any medication change in the last 6 months? no  Did you take prednisone or other immunosupressant drugs in the last 6 months   (chemo, transplant, rheum, dermatology conditions)? no  Did you have any serious infection in the last 6 months?no  Any recent hospitalizations?no  Do you plan any dental work in the next 2-3 months?no  How much calcium do you take daily from the diet and supplements?1200 mg  How much vit D do you take daily? 2000 IU  Last DXA?6/2021,  Reviewed " and discussed      Patient is here today for the 13th Prolia injection. Patient tolerated previous injections well.   We discussed calcium and vit D daily needs today.   I reviewed the lab results:  Component      Latest Ref Rng & Units 6/17/2021   Vitamin D, Total (25-Hydroxy)      30.0 - 80.0 ng/mL 31.8         We discussed high risk of rebound vertebral fractures when Prolia suddenly stopped.    Next Prolia injection will be in 6 months.           This note has been dictated using voice recognition software. Any grammatical or context distortions are unintentional and inherent to the software      Patient Active Problem List   Diagnosis     Hypothyroidism     Idiopathic osteoporosis     Hyperlipidemia     GERD (gastroesophageal reflux disease)     Frequent headaches       Current Outpatient Medications   Medication     calcium carb and citrat-mag ox 200 mg calcium- 50 mg Tab     cholecalciferol, vitamin D3, 5,000 unit Tab     clobetasoL (TEMOVATE) 0.05 % cream     levothyroxine (SYNTHROID/LEVOTHROID) 75 MCG tablet     multivitamin with minerals (THERA-M) 9 mg iron-400 mcg Tab tablet     rosuvastatin (CRESTOR) 5 MG tablet     Current Facility-Administered Medications   Medication     denosumab (PROLIA) injection 60 mg     Answers for HPI/ROS submitted by the patient on 6/22/2022  What is the reason for your visit today? : Osteoprosis shot  How many servings of fruits and vegetables do you eat daily?: 2-3  On average, how many sweetened beverages do you drink each day (Examples: soda, juice, sweet tea, etc.  Do NOT count diet or artificially sweetened beverages)?: 1  How many minutes a day do you exercise enough to make your heart beat faster?: 20 to 29  How many days a week do you exercise enough to make your heart beat faster?: 4  How many days per week do you miss taking your medication?: 0

## 2022-06-24 ENCOUNTER — ANCILLARY PROCEDURE (OUTPATIENT)
Dept: MAMMOGRAPHY | Facility: CLINIC | Age: 67
End: 2022-06-24
Attending: FAMILY MEDICINE
Payer: COMMERCIAL

## 2022-06-24 DIAGNOSIS — Z12.31 VISIT FOR SCREENING MAMMOGRAM: ICD-10-CM

## 2022-06-24 PROCEDURE — 77067 SCR MAMMO BI INCL CAD: CPT

## 2022-07-28 ENCOUNTER — TELEPHONE (OUTPATIENT)
Dept: FAMILY MEDICINE | Facility: CLINIC | Age: 67
End: 2022-07-28

## 2022-07-28 ENCOUNTER — VIRTUAL VISIT (OUTPATIENT)
Dept: FAMILY MEDICINE | Facility: CLINIC | Age: 67
End: 2022-07-28
Payer: COMMERCIAL

## 2022-07-28 DIAGNOSIS — U07.1 INFECTION DUE TO 2019 NOVEL CORONAVIRUS: Primary | ICD-10-CM

## 2022-07-28 PROCEDURE — 99213 OFFICE O/P EST LOW 20 MIN: CPT | Mod: 95 | Performed by: INTERNAL MEDICINE

## 2022-07-28 NOTE — TELEPHONE ENCOUNTER
Called pt and LVM. Asking her to call back to complete rooming prior to appt with Dr. Farooq at 240 today.

## 2022-07-28 NOTE — PROGRESS NOTES
Nancy is a 67 year old who is being evaluated via a billable telephone visit.      What phone number would you like to be contacted at? [phone]   How would you like to obtain your AVS? MyChart    Assessment & Plan     1.  Infection due to SARS-CoV-2.  Discussed treatment option. Paxlovid prescribed.  Drug drug interaction discussed.      Return in about 2 weeks (around 8/11/2022) for prn.    Marty Farooq MD  [facility]   Nancy is a 67 year old, presenting for the following health issues:  Covid Concern      HPI       COVID-19 Symptom Review  How many days ago did these symptoms start? Started last Tuesday approx 2 days ago. Found out on Sunday a friend tested positive and they were together.    Are any of the following symptoms significant for you?    New or worsening difficulty breathing? No    Worsening cough? Yes, it's a dry cough. Worse when laying cough     Fever or chills? Yes, the highest temperature was 101 last night and had chills today 98.6    Headache: ear pressure and HA    Sore throat: YES    Chest pain: No    Diarrhea: No    Body aches? YES    What treatments has patient tried? Acetaminophen this morning  Does patient live in a nursing home, group home, or shelter? No  Does patient have a way to get food/medications during quarantined? Yes, I have a friend or family member who can help me.      67-year-old lady with remote history of asthma and hypothyroidism presents with 2 to 3-day history of runny nose, generalized body aching, fever and a cough.  She tested positive for COVID at home.  She had an exposure to a friend who had COVID.    Review of Systems         Objective           Vitals:  No vitals were obtained today due to virtual visit.    Physical Exam   healthy, alert and no distress  PSYCH: Alert and oriented times 3; coherent speech, normal   rate and volume, able to articulate logical thoughts, able   to abstract reason, no tangential  thoughts, no hallucinations   or delusions  Her affect is normal  RESP: No cough, no audible wheezing, able to talk in full sentences  Remainder of exam unable to be completed due to telephone visits                Phone call duration: 10 minutes    .  ..

## 2022-09-15 DIAGNOSIS — E03.9 HYPOTHYROIDISM: ICD-10-CM

## 2022-09-16 RX ORDER — LEVOTHYROXINE SODIUM 75 UG/1
TABLET ORAL
Qty: 90 TABLET | Refills: 0 | Status: SHIPPED | OUTPATIENT
Start: 2022-09-16 | End: 2022-10-11

## 2022-09-16 NOTE — TELEPHONE ENCOUNTER
"Routing refill request to provider for review/approval because:  Labs not current:  TSH  Patient needs to be seen because it has been more than 1 year since last office visit.    Last Written Prescription Date:  6/21/22  Last Fill Quantity: 90,  # refills: 0   Last office visit provider:  5/11/21     Requested Prescriptions   Pending Prescriptions Disp Refills     levothyroxine (SYNTHROID/LEVOTHROID) 75 MCG tablet [Pharmacy Med Name: LEVOTHYROXINE 0.075MG (75MCG) TABS] 90 tablet 0     Sig: TAKE 1 TABLET(75 MCG) BY MOUTH DAILY       Thyroid Protocol Failed - 9/15/2022  9:27 AM        Failed - Normal TSH on file in past 12 months     Recent Labs   Lab Test 10/12/20  1017   TSH 1.58              Passed - Patient is 12 years or older        Passed - Recent (12 mo) or future (30 days) visit within the authorizing provider's specialty     Patient has had an office visit with the authorizing provider or a provider within the authorizing providers department within the previous 12 mos or has a future within next 30 days. See \"Patient Info\" tab in inbasket, or \"Choose Columns\" in Meds & Orders section of the refill encounter.              Passed - Medication is active on med list        Passed - No active pregnancy on record     If patient is pregnant or has had a positive pregnancy test, please check TSH.          Passed - No positive pregnancy test in past 12 months     If patient is pregnant or has had a positive pregnancy test, please check TSH.               Rohith Malloy 09/16/22 8:34 AM  "

## 2022-09-19 ENCOUNTER — LAB (OUTPATIENT)
Dept: LAB | Facility: CLINIC | Age: 67
End: 2022-09-19
Payer: COMMERCIAL

## 2022-09-19 DIAGNOSIS — Z13.220 LIPID SCREENING: ICD-10-CM

## 2022-09-19 DIAGNOSIS — M81.0 OSTEOPOROSIS, UNSPECIFIED OSTEOPOROSIS TYPE, UNSPECIFIED PATHOLOGICAL FRACTURE PRESENCE: ICD-10-CM

## 2022-09-19 DIAGNOSIS — Z00.00 MEDICARE ANNUAL WELLNESS VISIT, SUBSEQUENT: ICD-10-CM

## 2022-09-19 DIAGNOSIS — E03.9 HYPOTHYROIDISM, UNSPECIFIED TYPE: ICD-10-CM

## 2022-09-19 LAB
ALBUMIN SERPL BCG-MCNC: 4.5 G/DL (ref 3.5–5.2)
ALP SERPL-CCNC: 30 U/L (ref 35–104)
ALT SERPL W P-5'-P-CCNC: 17 U/L (ref 10–35)
ANION GAP SERPL CALCULATED.3IONS-SCNC: 10 MMOL/L (ref 7–15)
AST SERPL W P-5'-P-CCNC: 25 U/L (ref 10–35)
BILIRUB SERPL-MCNC: 0.4 MG/DL
BUN SERPL-MCNC: 21.7 MG/DL (ref 8–23)
CALCIUM SERPL-MCNC: 9.7 MG/DL (ref 8.8–10.2)
CHLORIDE SERPL-SCNC: 103 MMOL/L (ref 98–107)
CHOLEST SERPL-MCNC: 199 MG/DL
CREAT SERPL-MCNC: 0.89 MG/DL (ref 0.51–0.95)
DEPRECATED HCO3 PLAS-SCNC: 26 MMOL/L (ref 22–29)
ERYTHROCYTE [DISTWIDTH] IN BLOOD BY AUTOMATED COUNT: 12.3 % (ref 10–15)
GFR SERPL CREATININE-BSD FRML MDRD: 71 ML/MIN/1.73M2
GLUCOSE SERPL-MCNC: 105 MG/DL (ref 70–99)
HCT VFR BLD AUTO: 38.9 % (ref 35–47)
HDLC SERPL-MCNC: 63 MG/DL
HGB BLD-MCNC: 13 G/DL (ref 11.7–15.7)
LDLC SERPL CALC-MCNC: 116 MG/DL
MCH RBC QN AUTO: 30.4 PG (ref 26.5–33)
MCHC RBC AUTO-ENTMCNC: 33.4 G/DL (ref 31.5–36.5)
MCV RBC AUTO: 91 FL (ref 78–100)
NONHDLC SERPL-MCNC: 136 MG/DL
PLATELET # BLD AUTO: 284 10E3/UL (ref 150–450)
POTASSIUM SERPL-SCNC: 4.3 MMOL/L (ref 3.4–5.3)
PROT SERPL-MCNC: 7.4 G/DL (ref 6.4–8.3)
RBC # BLD AUTO: 4.28 10E6/UL (ref 3.8–5.2)
SODIUM SERPL-SCNC: 139 MMOL/L (ref 136–145)
TRIGL SERPL-MCNC: 101 MG/DL
TSH SERPL DL<=0.005 MIU/L-ACNC: 1.68 UIU/ML (ref 0.3–4.2)
WBC # BLD AUTO: 5.3 10E3/UL (ref 4–11)

## 2022-09-19 PROCEDURE — 83036 HEMOGLOBIN GLYCOSYLATED A1C: CPT | Performed by: FAMILY MEDICINE

## 2022-09-19 PROCEDURE — 36415 COLL VENOUS BLD VENIPUNCTURE: CPT

## 2022-09-19 PROCEDURE — 80061 LIPID PANEL: CPT

## 2022-09-19 PROCEDURE — 80053 COMPREHEN METABOLIC PANEL: CPT

## 2022-09-19 PROCEDURE — 85027 COMPLETE CBC AUTOMATED: CPT

## 2022-09-19 PROCEDURE — 84443 ASSAY THYROID STIM HORMONE: CPT

## 2022-09-21 DIAGNOSIS — R73.01 ELEVATED FASTING GLUCOSE: Primary | ICD-10-CM

## 2022-09-21 LAB — HBA1C MFR BLD: 5.6 % (ref 0–5.6)

## 2022-09-25 ENCOUNTER — HEALTH MAINTENANCE LETTER (OUTPATIENT)
Age: 67
End: 2022-09-25

## 2022-10-06 NOTE — PROGRESS NOTES
As part of the required manual data conversion process for integration, this encounter was created to document a CAM (Clinic Administered Medication) order. This information was copied from the [facility] patient's chart to the [facility] patient chart.     Siria Talbot, PharmD  November 19, 2021  
Home
06-Oct-2022

## 2022-10-11 ENCOUNTER — OFFICE VISIT (OUTPATIENT)
Dept: FAMILY MEDICINE | Facility: CLINIC | Age: 67
End: 2022-10-11
Payer: COMMERCIAL

## 2022-10-11 VITALS
SYSTOLIC BLOOD PRESSURE: 118 MMHG | HEIGHT: 65 IN | BODY MASS INDEX: 22.99 KG/M2 | WEIGHT: 138 LBS | TEMPERATURE: 98.9 F | DIASTOLIC BLOOD PRESSURE: 73 MMHG | OXYGEN SATURATION: 96 % | RESPIRATION RATE: 12 BRPM | HEART RATE: 80 BPM

## 2022-10-11 DIAGNOSIS — Z82.49 FAMILY HISTORY OF PREMATURE CORONARY HEART DISEASE: ICD-10-CM

## 2022-10-11 DIAGNOSIS — Z00.01 ENCOUNTER FOR GENERAL ADULT MEDICAL EXAMINATION WITH ABNORMAL FINDINGS: Primary | ICD-10-CM

## 2022-10-11 DIAGNOSIS — E78.5 HYPERLIPIDEMIA, UNSPECIFIED HYPERLIPIDEMIA TYPE: ICD-10-CM

## 2022-10-11 DIAGNOSIS — N39.3 FEMALE STRESS INCONTINENCE: ICD-10-CM

## 2022-10-11 DIAGNOSIS — M81.8 IDIOPATHIC OSTEOPOROSIS: ICD-10-CM

## 2022-10-11 DIAGNOSIS — E03.9 HYPOTHYROIDISM, UNSPECIFIED TYPE: ICD-10-CM

## 2022-10-11 PROBLEM — K21.9 GERD (GASTROESOPHAGEAL REFLUX DISEASE): Status: RESOLVED | Noted: 2018-07-13 | Resolved: 2022-10-11

## 2022-10-11 PROBLEM — R51.9 FREQUENT HEADACHES: Status: RESOLVED | Noted: 2018-07-22 | Resolved: 2022-10-11

## 2022-10-11 PROCEDURE — 90472 IMMUNIZATION ADMIN EACH ADD: CPT | Performed by: FAMILY MEDICINE

## 2022-10-11 PROCEDURE — 99397 PER PM REEVAL EST PAT 65+ YR: CPT | Mod: 25 | Performed by: FAMILY MEDICINE

## 2022-10-11 PROCEDURE — 99213 OFFICE O/P EST LOW 20 MIN: CPT | Mod: 25 | Performed by: FAMILY MEDICINE

## 2022-10-11 PROCEDURE — 90677 PCV20 VACCINE IM: CPT | Performed by: FAMILY MEDICINE

## 2022-10-11 PROCEDURE — 90471 IMMUNIZATION ADMIN: CPT | Performed by: FAMILY MEDICINE

## 2022-10-11 PROCEDURE — 90662 IIV NO PRSV INCREASED AG IM: CPT | Performed by: FAMILY MEDICINE

## 2022-10-11 RX ORDER — FEXOFENADINE HCL AND PSEUDOEPHEDRINE HCL 180; 240 MG/1; MG/1
1 TABLET, EXTENDED RELEASE ORAL DAILY
COMMUNITY

## 2022-10-11 RX ORDER — ROSUVASTATIN CALCIUM 10 MG/1
10 TABLET, COATED ORAL AT BEDTIME
Qty: 90 TABLET | Refills: 4 | Status: SHIPPED | OUTPATIENT
Start: 2022-10-11 | End: 2022-11-02

## 2022-10-11 RX ORDER — MULTIVIT-MIN/IRON/FOLIC ACID/K 18-600-40
CAPSULE ORAL
COMMUNITY

## 2022-10-11 RX ORDER — LEVOTHYROXINE SODIUM 75 UG/1
TABLET ORAL
Qty: 90 TABLET | Refills: 4 | Status: SHIPPED | OUTPATIENT
Start: 2022-10-11

## 2022-10-11 RX ORDER — CETIRIZINE HYDROCHLORIDE 10 MG/1
10 TABLET ORAL DAILY
COMMUNITY

## 2022-10-11 ASSESSMENT — ENCOUNTER SYMPTOMS
WEAKNESS: 0
CONSTIPATION: 0
ARTHRALGIAS: 0
HEARTBURN: 0
COUGH: 0
NAUSEA: 0
HEADACHES: 0
FEVER: 0
SHORTNESS OF BREATH: 0
PARESTHESIAS: 0
DIARRHEA: 0
DIZZINESS: 0
BREAST MASS: 0
HEMATOCHEZIA: 0
PALPITATIONS: 0
EYE PAIN: 0
SORE THROAT: 0
JOINT SWELLING: 0
NERVOUS/ANXIOUS: 0
MYALGIAS: 0
HEMATURIA: 0
DYSURIA: 0
CHILLS: 0
FREQUENCY: 0
ABDOMINAL PAIN: 0

## 2022-10-11 ASSESSMENT — ACTIVITIES OF DAILY LIVING (ADL): CURRENT_FUNCTION: NO ASSISTANCE NEEDED

## 2022-10-11 NOTE — PATIENT INSTRUCTIONS
Patient Education   Personalized Prevention Plan  You are due for the preventive services outlined below.  Your care team is available to assist you in scheduling these services.  If you have already completed any of these items, please share that information with your care team to update in your medical record.  Health Maintenance Due   Topic Date Due     ANNUAL REVIEW OF HM ORDERS  Never done     Zoster (Shingles) Vaccine (2 of 3) 05/11/2017     Annual Wellness Visit  11/30/2021     Pneumococcal Vaccine (2 - PPSV23 if available, else PCV20) 11/30/2021     COVID-19 Vaccine (5 - Booster for Moderna series) 06/26/2022     Flu Vaccine (1) 09/01/2022       Understanding USDA MyPlate  The USDA has guidelines to help you make healthy food choices. These are called MyPlate. MyPlate shows the food groups that make up healthy meals using the image of a place setting. Before you eat, think about the healthiest choices for what to put on your plate or in your cup or bowl. To learn more about building a healthy plate, visit www.choosemyplate.gov.    The food groups    Fruits. Any fruit or 100% fruit juice counts as part of the Fruit Group. Fruits may be fresh, canned, frozen, or dried, and may be whole, cut-up, or pureed. Make 1/2 of your plate fruits and vegetables.    Vegetables. Any vegetable or 100% vegetable juice counts as a member of the Vegetable Group. Vegetables may be fresh, frozen, canned, or dried. They can be served raw or cooked and may be whole, cut-up, or mashed. Make 1/2 of your plate fruits and vegetables.    Grains. All foods made from grains are part of the Grains Group. These include wheat, rice, oats, cornmeal, and barley. Grains are often used to make foods such as bread, pasta, oatmeal, cereal, tortillas, and grits. Grains should be no more than 1/4 of your plate. At least half of your grains should be whole grains.    Protein. This group includes meat, poultry, seafood, beans and peas, eggs,  processed soy products (such as tofu), nuts (including nut butters), and seeds. Make protein choices no more than 1/4 of your plate. Meat and poultry choices should be lean or low fat.    Dairy. The Dairy Group includes all fluid milk products and foods made from milk that contain calcium, such as yogurt and cheese. (Foods that have little calcium, such as cream, butter, and cream cheese, are not part of this group.) Most dairy choices should be low-fat or fat-free.    Oils. Oils aren't a food group, but they do contain essential nutrients. However it's important to watch your intake of oils. These are fats that are liquid at room temperature. They include canola, corn, olive, soybean, vegetable, and sunflower oil. Foods that are mainly oil include mayonnaise, certain salad dressings, and soft margarines. You likely already get your daily oil allowance from the foods you eat.  Things to limit  Eating healthy also means limiting these things in your diet:       Salt (sodium). Many processed foods have a lot of sodium. To keep sodium intake down, eat fresh vegetables, meats, poultry, and seafood when possible. Purchase low-sodium, reduced-sodium, or no-salt-added food products at the store. And don't add salt to your meals at home. Instead, season them with herbs and spices such as dill, oregano, cumin, and paprika. Or try adding flavor with lemon or lime zest and juice.    Saturated fat. Saturated fats are most often found in animal products such as beef, pork, and chicken. They are often solid at room temperature, such as butter. To reduce your saturated fat intake, choose leaner cuts of meat and poultry. And try healthier cooking methods such as grilling, broiling, roasting, or baking. For a simple lower-fat swap, use plain nonfat yogurt instead of mayonnaise when making potato salad or macaroni salad.    Added sugars. These are sugars added to foods. They are in foods such as ice cream, candy, soda, fruit drinks,  sports drinks, energy drinks, cookies, pastries, jams, and syrups. Cut down on added sugars by sharing sweet treats with a family member or friend. You can also choose fruit for dessert, and drink water or other unsweetened beverages.     [employer] last reviewed this educational content on 6/1/2020 2000-2021 [employer]. All rights reserved. This information is not intended as a substitute for professional medical care. Always follow your healthcare professional's instructions.          Urinary Incontinence, Female (Adult)   Urinary incontinence means loss of bladder control. This problem affects many women, especially as they get older. If you have incontinence, you may be embarrassed to ask for help. But know that this problem can be treated.   Types of Incontinence  There are different types of incontinence. Two of the main types are described here. You can have more than one type.     Stress incontinence. With this type, urine leaks when pressure (stress) is put on the bladder. This may happen when you cough, sneeze, or laugh. Stress incontinence most often occurs because the pelvic floor muscles that support the bladder and urethra are weak. This can happen after pregnancy and vaginal childbirth or a hysterectomy. It can also be due to excess body weight or hormone changes.    Urge incontinence (also called overactive bladder). With this type, a sudden urge to urinate is felt often. This may happen even though there may not be much urine in the bladder. The need to urinate often during the night is common. Urge incontinence most often occurs because of bladder spasms. This may be due to bladder irritation or infection. Damage to bladder nerves or pelvic muscles, constipation, and certain medicines can also lead to urge incontinence.  Treatment depends on the cause. Further evaluation is needed to find the type you have. This will likely include an exam and certain tests. Based on the results,  you and your healthcare provider can then plan treatment. Until a diagnosis is made, the home care tips below can help ease symptoms.   Home care    Do pelvic floor muscle exercises, if they are prescribed. The pelvic floor muscles help support the bladder and urethra. Many women find that their symptoms improve when doing special exercises that strengthen these muscles. To do the exercises, contract the muscles you would use to stop your stream of urine. But do this when you re not urinating. Hold for 10 seconds, then relax. Repeat 10 to 20 times in a row, at least 3 times a day. Your healthcare provider may give you other instructions for how to do the exercises and how often.    Keep a bladder diary. This helps track how often and how much you urinate over a set period of time. Bring this diary with you to your next visit with the provider. The information can help your provider learn more about your bladder problem.    Lose weight, if advised to by your provider. Extra weight puts pressure on the bladder. Your provider can help you create a weight-loss plan that s right for you. This may include exercising more and making certain diet changes.    Don't have foods and drinks that may irritate the bladder. These can include alcohol and caffeinated drinks.    Quit smoking. Smoking and other tobacco use can lead to a long-term (chronic) cough that strains the pelvic floor muscles. Smoking may also damage the bladder and urethra. Talk with your provider about treatments or methods you can use to quit smoking.    If drinking large amounts of fluid makes you have symptoms, you may be advised to limit your fluid intake. You may also be advised to drink most of your fluids during the day and to limit fluids at night.    If you re worried about urine leakage or accidents, you may wear absorbent pads to catch urine. Change the pads often. This helps reduce discomfort. It may also reduce the risk of skin or bladder  infections.    Follow-up care  Follow up with your healthcare provider, or as directed. It may take some to find the right treatment for your problem. But healthy lifestyle changes can be made right away. These include such things as exercising on a regular basis, eating a healthy diet, losing weight (if needed), and quitting smoking. Your treatment plan may include special therapies or medicines. Certain procedures or surgery may also be options. Talk about any questions you have with your provider.   When to seek medical advice  Call the healthcare provider right away if any of these occur:    Fever of 100.4 F (38 C) or higher, or as directed by your provider    Bladder pain or fullness    Belly swelling    Nausea or vomiting    Back pain    Weakness, dizziness, or fainting  [employer] last reviewed this educational content on 1/1/2020 2000-2021 [employer]. All rights reserved. This information is not intended as a substitute for professional medical care. Always follow your healthcare professional's instructions.

## 2022-10-11 NOTE — PROGRESS NOTES
"   SUBJECTIVE:   CC: Nancy is an 67 year old who presents for preventive health visit.        Patient has been advised of split billing requirements and indicates understanding: Yes  Healthy Habits:     In general, how would you rate your overall health?  Excellent    Frequency of exercise:  2-3 days/week    Duration of exercise:  30-45 minutes    Do you usually eat at least 4 servings of fruit and vegetables a day, include whole grains    & fiber and avoid regularly eating high fat or \"junk\" foods?  No    Taking medications regularly:  Yes    Ability to successfully perform activities of daily living:  No assistance needed    Home Safety:  No safety concerns identified    Hearing Impairment:  No hearing concerns    In the past 6 months, have you been bothered by leaking of urine? Yes    In general, how would you rate your overall mental or emotional health?  Excellent      PHQ-2 Total Score: 0    Additional concerns today:  No    Son just got . Got covid in July. Took Paxlovid.   Still being treated for osteoporosis with Prolia.   Wakes up intermittently and gets a little tingling like a sun burn lower legs above ankles and just goes back to bed.   Still gets cramps in legs so just started retaking the magnesium. Toes cramping up. Water helps but can tell when doesn't drink enough. Not nightly.     Doing well on prolia- they recommended possible change to reclast in 2 years. Bone density coming up.  Has had 13 total.     Cholesterol improved. Exercising more    Wt Readings from Last 3 Encounters:   10/11/22 62.6 kg (138 lb)   06/22/22 62.1 kg (137 lb)   06/17/21 59.6 kg (131 lb 4.8 oz)         Today's PHQ-2 Score:   PHQ-2 ( 1999 Pfizer) 10/11/2022   Q1: Little interest or pleasure in doing things 0   Q2: Feeling down, depressed or hopeless 0   PHQ-2 Score 0   Q1: Little interest or pleasure in doing things Not at all   Q2: Feeling down, depressed or hopeless Not at all   PHQ-2 Score 0       Abuse: Current " or Past (Physical, Sexual or Emotional) - No  Do you feel safe in your environment? Yes        Social History     Tobacco Use     Smoking status: Never     Smokeless tobacco: Never   Substance Use Topics     Alcohol use: Yes     Comment: Alcoholic Drinks/day: 2         Alcohol Use 10/11/2022   Prescreen: >3 drinks/day or >7 drinks/week? No       Reviewed orders with patient.  Reviewed health maintenance and updated orders accordingly - Yes  BP Readings from Last 3 Encounters:   10/11/22 118/73   22 110/80   21 118/69    Wt Readings from Last 3 Encounters:   10/11/22 62.6 kg (138 lb)   22 62.1 kg (137 lb)   21 59.6 kg (131 lb 4.8 oz)                  Patient Active Problem List   Diagnosis     Hypothyroidism     Idiopathic osteoporosis     Hyperlipidemia     GERD (gastroesophageal reflux disease)     Frequent headaches     Past Surgical History:   Procedure Laterality Date     CHOLECYSTECTOMY      1986     OTHER SURGICAL HISTORY      [id number]     OVARIAN CYST REMOVAL      10/1972     Z OSTEOTOMY CLAVICLE      Description: Osteotomy Of The Clavicle;  Recorded: 2012;       Social History     Tobacco Use     Smoking status: Never     Smokeless tobacco: Never   Substance Use Topics     Alcohol use: Yes     Comment: Alcoholic Drinks/day: 2     Family History   Problem Relation Age of Onset     Coronary Artery Disease Mother         bypass  70s, smoked, overweight      Osteoporosis Mother      Breast Cancer Mother         dx 70      Coronary Artery Disease Father         MI- smoker 54     Prostate Cancer Father         met to bone      Lung Cancer Sister         smoker,  age 54     Sleep Apnea Brother          Current Outpatient Medications   Medication Sig Dispense Refill     Ascorbic Acid (VITAMIN C) 500 MG CAPS        calcium carb and citrat-mag ox 200 mg calcium- 50 mg Tab [CALCIUM CARB AND CITRAT-MAG  MG CALCIUM- 50 MG TAB] Take by mouth.       cetirizine (ZYRTEC) 10  MG tablet Take 1 tablet (10 mg) by mouth daily Occassionally       cholecalciferol, vitamin D3, 5,000 unit Tab Take 1 tablet (125 mcg) by mouth daily 5000 one day a week and 1000 the rest of the days.       clobetasoL (TEMOVATE) 0.05 % cream [CLOBETASOL (TEMOVATE) 0.05 % CREAM] Apply 1 application topically 2 (two) times a day. 30 g 1     fexofenadine-pseudoePHEDrine (ALLEGRA-D 24) 180-240 MG 24 hr tablet Take 1 tablet by mouth daily       levothyroxine (SYNTHROID/LEVOTHROID) 75 MCG tablet TAKE 1 TABLET(75 MCG) BY MOUTH DAILY 90 tablet 0     multivitamin with minerals (THERA-M) 9 mg iron-400 mcg Tab tablet [MULTIVITAMIN WITH MINERALS (THERA-M) 9 MG IRON-400 MCG TAB TABLET] Take 1 tablet by mouth daily.       rosuvastatin (CRESTOR) 5 MG tablet TAKE 1 TABLET(5 MG) BY MOUTH AT BEDTIME FOR CHOLESTEROL 90 tablet 4     Allergies   Allergen Reactions     Codeine Unknown     Nausea      Hydrocodone-Acetaminophen Unknown     nausesa      Other Environmental Allergy Unknown     Sulfa (Sulfonamide Antibiotics) [Sulfa Drugs] Unknown     Itchy eyes        Breast Cancer Screening:    FHS-7:   Breast CA Risk Assessment (FHS-7) 6/24/2022 10/11/2022   Did any of your first-degree relatives have breast or ovarian cancer? Yes Yes   Did any of your relatives have bilateral breast cancer? No Unknown   Did any man in your family have breast cancer? No No   Did any woman in your family have breast and ovarian cancer? No Yes   Did any woman in your family have breast cancer before age 50 y? No No   Do you have 2 or more relatives with breast and/or ovarian cancer? No No   Do you have 2 or more relatives with breast and/or bowel cancer? No No       Mammogram Screening: Recommended mammography every 1-2 years with patient discussion and risk factor consideration  Pertinent mammograms are reviewed under the imaging tab.    History of abnormal Pap smear: NO - age 30-65 PAP every 5 years with negative HPV co-testing recommended  PAP / HPV  Latest Ref Rng & Units 2018   PAP - Negative for squamous intraepithelial lesion or malignancy  Electronically signed by Nancy Arce CT (ASCP) on 2018 at  8:24 AM     HPV16 NEG Negative   HPV18 NEG Negative   HRHPV NEG Negative     Reviewed and updated as needed this visit by clinical staff    Allergies  Meds              Reviewed and updated as needed this visit by Provider                 Past Medical History:   Diagnosis Date     Intermittent asthma      Intermittent asthma      Osteoporosis     a      Past Surgical History:   Procedure Laterality Date     CHOLECYSTECTOMY      1986     OTHER SURGICAL HISTORY      [id number]     OVARIAN CYST REMOVAL      10/1972     Z OSTEOTOMY CLAVICLE      Description: Osteotomy Of The Clavicle;  Recorded: 2012;     OB History    Para Term  AB Living   2 2 2 0 0 0   SAB IAB Ectopic Multiple Live Births   0 0 0 0 0      # Outcome Date GA Lbr Morgan/2nd Weight Sex Delivery Anes PTL Lv   2 Term            1 Term                Review of Systems   Constitutional: Negative for chills and fever.   HENT: Negative for congestion, ear pain, hearing loss and sore throat.    Eyes: Negative for pain and visual disturbance.   Respiratory: Negative for cough and shortness of breath.    Cardiovascular: Negative for chest pain, palpitations and peripheral edema.   Gastrointestinal: Negative for abdominal pain, constipation, diarrhea, heartburn, hematochezia and nausea.   Breasts:  Negative for tenderness, breast mass and discharge.   Genitourinary: Negative for dysuria, frequency, genital sores, hematuria, pelvic pain, urgency, vaginal bleeding and vaginal discharge.   Musculoskeletal: Negative for arthralgias, joint swelling and myalgias.   Skin: Negative for rash.   Neurological: Negative for dizziness, weakness, headaches and paresthesias.   Psychiatric/Behavioral: Negative for mood changes. The patient is not nervous/anxious.      CONSTITUTIONAL:  "NEGATIVE for fever, chills, change in weight  INTEGUMENTARY/SKIN: NEGATIVE for worrisome rashes, moles or lesions  EYES: NEGATIVE for vision changes or irritation  ENT: NEGATIVE for ear, mouth and throat problems  RESP: NEGATIVE for significant cough or SOB  BREAST: NEGATIVE for masses, tenderness or discharge  CV: NEGATIVE for chest pain, palpitations or peripheral edema  GI: NEGATIVE for nausea, abdominal pain, heartburn, or change in bowel habits  : NEGATIVE for unusual urinary or vaginal symptoms. No vaginal bleeding.  MUSCULOSKELETAL: NEGATIVE for significant arthralgias or myalgia  NEURO: NEGATIVE for weakness, dizziness or paresthesias  PSYCHIATRIC: NEGATIVE for changes in mood or affect      OBJECTIVE:   /73   Pulse 80   Temp 98.9  F (37.2  C) (Oral)   Resp 12   Ht 1.638 m (5' 4.5\")   Wt 62.6 kg (138 lb)   SpO2 96%   BMI 23.32 kg/m    Physical Exam  GENERAL: healthy, alert and no distress  EYES: Eyes grossly normal to inspection, PERRL and conjunctivae and sclerae normal  HENT: ear canals and TM's normal, nose and mouth without ulcers or lesions  NECK: no adenopathy, no asymmetry, masses, or scars and thyroid normal to palpation  RESP: lungs clear to auscultation - no rales, rhonchi or wheezes  BREAST: normal without masses, tenderness or nipple discharge and no palpable axillary masses or adenopathy  CV: regular rate and rhythm, normal S1 S2, no S3 or S4, no murmur, click or rub, no peripheral edema and peripheral pulses strong  ABDOMEN: soft, nontender, no hepatosplenomegaly, no masses and bowel sounds normal  MS: no gross musculoskeletal defects noted, no edema  SKIN: no suspicious lesions or rashes  NEURO: Normal strength and tone, mentation intact and speech normal  PSYCH: mentation appears normal, affect normal/bright  LYMPH: no cervical, supraclavicular, axillary, or inguinal adenopathy    Diagnostic Test Results:  Labs reviewed in Epic  No results found for this or any previous visit " (from the past 24 hour(s)).    ASSESSMENT/PLAN:   Lynette was seen today for physical.    Diagnoses and all orders for this visit:    Encounter for Medicare annual wellness exam-normal physical exam at this time.  Discussed advance care directive.  Immunizations being updated today and she will look into her insurance about the shingles vaccine.    Hypothyroidism, unspecified type-last thyroid level in great range.  Will refill for the year.     Idiopathic osteoporosis-patient is receiving Prolia injections with Dr. Michael she will continue to manage with her but needs a vitamin D level.  Calcium is normal no adverse symptoms  -     Vitamin D Deficiency; Future    Female stress incontinence    Family history of premature coronary heart disease-we discussed her family history that includes coronary disease early in her father and mother also  but was contributed to by smoking.  She is interested in knowing her risk further but we did discuss that otherwise her cholesterol levels look great.  She is concerned about her cholesterol and has been on rosuvastatin.  Cholesterol levels look a lot better but if we are concerned about coronary risk then would recommend going to 10 mg.  Previously we told her that she did not need to be on medication at all but she did not like to see her numbers that elevated so if we are going to treat her we should treat with a goal LDL at least below 100  -     CT Coronary Calcium Scan; Future    Other orders  -     REVIEW OF HEALTH MAINTENANCE PROTOCOL ORDERS  -     Pneumococcal 20 Valent Conjugate (PCV20)  -     INFLUENZA, QUAD, HIGH DOSE, PF, 65YR + (FLUZONE HD)      Spent a good amount of time discussing her mother who had a history of breast cancer.  We decided that at this time ultrasound is not advised but should continue with annual mammography with 3D.  We do not know of any BRCA mutation as no other family members have had a history of breast cancer so for now we will  "not recommend US or MRI    Patient has been advised of split billing requirements and indicates understanding: Yes    COUNSELING:  Reviewed preventive health counseling, as reflected in patient instructions       Regular exercise       Healthy diet/nutrition       Vision screening       Advance Care Planning    Estimated body mass index is 23.32 kg/m  as calculated from the following:    Height as of this encounter: 1.638 m (5' 4.5\").    Weight as of this encounter: 62.6 kg (138 lb).        She reports that she has never smoked. She has never used smokeless tobacco.      Counseling Resources:  ATP IV Guidelines  Pooled Cohorts Equation Calculator  Breast Cancer Risk Calculator  BRCA-Related Cancer Risk Assessment: FHS-7 Tool  FRAX Risk Assessment  ICSI Preventive Guidelines  Dietary Guidelines for Americans, 2010  USDA's MyPlate  ASA Prophylaxis  Lung CA Screening    Leona He, DO      33 minutes spent on the date of the encounter doing chart review, history and exam, documentation and further activities per the note       "

## 2022-10-26 ENCOUNTER — HOSPITAL ENCOUNTER (OUTPATIENT)
Dept: CT IMAGING | Facility: CLINIC | Age: 67
Discharge: HOME | End: 2022-10-26
Attending: FAMILY MEDICINE | Admitting: FAMILY MEDICINE

## 2022-10-26 DIAGNOSIS — Z82.49 FAMILY HISTORY OF PREMATURE CORONARY HEART DISEASE: ICD-10-CM

## 2022-10-26 PROCEDURE — 75571 CT HRT W/O DYE W/CA TEST: CPT | Mod: 26 | Performed by: GENERAL ACUTE CARE HOSPITAL

## 2022-10-26 PROCEDURE — 75571 CT HRT W/O DYE W/CA TEST: CPT

## 2022-10-27 LAB
CV CALCIUM SCORE AGATSTON LM: 0
CV CALCIUM SCORING AGATSON LAD: 85
CV CALCIUM SCORING AGATSTON CX: 0
CV CALCIUM SCORING AGATSTON RCA: 9
CV CALCIUM SCORING AGATSTON TOTAL: 94

## 2022-11-02 RX ORDER — ROSUVASTATIN CALCIUM 20 MG/1
20 TABLET, COATED ORAL AT BEDTIME
Qty: 90 TABLET | Refills: 4 | Status: SHIPPED | OUTPATIENT
Start: 2022-11-02

## 2022-12-21 ENCOUNTER — TELEPHONE (OUTPATIENT)
Dept: INTERNAL MEDICINE | Facility: CLINIC | Age: 67
End: 2022-12-21

## 2022-12-21 NOTE — TELEPHONE ENCOUNTER
Spoke with Dr Michael, she said as long as patient has no fever or worsening symptoms/signs of infection, it's ok for her to get the Prolia injection as scheduled.    LVM for the patient letting her know.

## 2022-12-21 NOTE — TELEPHONE ENCOUNTER
Pt has a Prolia shot scheduled for tomorrow. Last night she had a little cold/cough, she did take a daytime cold tablet. She is doing ok today, no cough/no fever. Neg covid tests. Pt is wondering if she should wait to get her Prolia injection or if its ok to get her injection tomorrow. Please call [phone], Ok to leave message.

## 2022-12-26 ENCOUNTER — ALLIED HEALTH/NURSE VISIT (OUTPATIENT)
Dept: FAMILY MEDICINE | Facility: CLINIC | Age: 67
End: 2022-12-26
Payer: COMMERCIAL

## 2022-12-26 DIAGNOSIS — M81.8 IDIOPATHIC OSTEOPOROSIS: Primary | ICD-10-CM

## 2022-12-26 DIAGNOSIS — Z92.29 PERSONAL HISTORY OF OTHER DRUG THERAPY: ICD-10-CM

## 2022-12-26 PROCEDURE — 99207 PR NO CHARGE NURSE ONLY: CPT

## 2022-12-26 PROCEDURE — 96372 THER/PROPH/DIAG INJ SC/IM: CPT | Performed by: INTERNAL MEDICINE

## 2022-12-26 NOTE — PROGRESS NOTES
"Prolia Injection Phone Screen      Screening questions have been asked 2-3 days prior to administration visit for Prolia. If any questions are answered with \"Yes,\" this phone encounter were will routed to ordering provider for further evaluation.     1.  When was the last injection?  6/22/2022    2.  Has insurance for this injection been verified?  Yes    3.  Did you experience any new onset achiness or rashes that lasted for over a month with your previous Prolia injection?   No    4.  Do you have a fever over 101?F or a new deep cough that is unusual for you today? No    5.  Have you started any new medications in the last 6 months that you were told could affect your immune system? These may have been prescribed by oncologist, transplant, rheumatology, or dermatology.   No    6.  In the last 6 months have you have gastric bypass or parathyroid surgery?   No    7.  Do you plan dental work requiring drilling into the bone such as implants/extractions or oral surgery in the next 2-3 months?   No    8. Do you have new insurance since the last injection?    9. Have you received the Covid-19 vaccine? Yes  If No - Proceed with Prolia injection  If Yes - Date of vaccination     COVID-19 Vaccine 18+ (Moderna)        01/25/2021 02/27/2021 11/02/2021 05/01/2022    Will need to wait until 2 weeks after 2nd dose of Covid-19 vaccine before administering Prolia       Patient informed if symptoms discussed above present prior to their administration appointment, they are to notify clinic immediately.     The following steps were completed to comply with the REMS program for Prolia:  1. Ordering provider has previously reviewed information in the Medication Guide and Patient Counseling Chart, including the serious risks of Prolia  and the symptoms of each risk and have been advised to seek prompt medical attention if they have signs or symptoms of any of the serious risks.  2. Provided " each patient a copy of the Medication Guide and Patient Brochure.  See MAR for administration details.   Indication: Prolia  (denosumab) is a prescription medicine used to treat osteoporosis in patients who:   Are at high risk for fracture, meaning patients who have had a fracture related to osteoporosis, or who have multiple risk factors for fracture; Cannot use another osteoporosis medicine or other osteoporosis medicines did not work well.   The timeline for early/late injections would be 4 weeks early and any time after the 6 month edwardo. If a patient receives their injection late, then the subsequent injection would be 6 months from the date that they actually received the injection    Have the screening questions been asked prior to this administration? Yes    Scheduled next Prolia in 6 months with Fernanda.      Hortensia, CMA

## 2023-03-29 DIAGNOSIS — K21.9 GASTROESOPHAGEAL REFLUX DISEASE WITHOUT ESOPHAGITIS: ICD-10-CM

## 2023-03-29 DIAGNOSIS — M81.8 IDIOPATHIC OSTEOPOROSIS: Primary | ICD-10-CM

## 2023-03-29 DIAGNOSIS — Z92.29 PERSONAL HISTORY OF OTHER DRUG THERAPY: ICD-10-CM

## 2023-03-29 DIAGNOSIS — M81.0 AGE RELATED OSTEOPOROSIS, UNSPECIFIED PATHOLOGICAL FRACTURE PRESENCE: ICD-10-CM

## 2023-03-29 DIAGNOSIS — Z87.442 HISTORY OF KIDNEY STONES: ICD-10-CM

## 2023-06-26 ENCOUNTER — ANCILLARY PROCEDURE (OUTPATIENT)
Dept: MAMMOGRAPHY | Facility: HOSPITAL | Age: 68
End: 2023-06-26
Attending: FAMILY MEDICINE
Payer: COMMERCIAL

## 2023-06-26 DIAGNOSIS — Z12.31 VISIT FOR SCREENING MAMMOGRAM: ICD-10-CM

## 2023-06-26 PROCEDURE — 77067 SCR MAMMO BI INCL CAD: CPT

## 2023-06-27 ENCOUNTER — OFFICE VISIT (OUTPATIENT)
Dept: INTERNAL MEDICINE | Facility: CLINIC | Age: 68
End: 2023-06-27
Payer: COMMERCIAL

## 2023-06-27 VITALS
BODY MASS INDEX: 23.38 KG/M2 | TEMPERATURE: 97.6 F | WEIGHT: 140.3 LBS | HEIGHT: 65 IN | HEART RATE: 75 BPM | DIASTOLIC BLOOD PRESSURE: 60 MMHG | OXYGEN SATURATION: 98 % | SYSTOLIC BLOOD PRESSURE: 110 MMHG | RESPIRATION RATE: 16 BRPM

## 2023-06-27 DIAGNOSIS — E03.9 ACQUIRED HYPOTHYROIDISM: ICD-10-CM

## 2023-06-27 DIAGNOSIS — M81.8 IDIOPATHIC OSTEOPOROSIS: Primary | ICD-10-CM

## 2023-06-27 DIAGNOSIS — Z87.442 HISTORY OF KIDNEY STONES: ICD-10-CM

## 2023-06-27 PROCEDURE — 96372 THER/PROPH/DIAG INJ SC/IM: CPT | Performed by: INTERNAL MEDICINE

## 2023-06-27 PROCEDURE — 99214 OFFICE O/P EST MOD 30 MIN: CPT | Mod: 25 | Performed by: INTERNAL MEDICINE

## 2023-06-27 NOTE — PATIENT INSTRUCTIONS
Prolia 15th today.  Prolia 16th in 6 months with my nurse. I will see you in 1 year.    DXA due now .   Phone number to schedule [phone].    Daily calcium need is 3007-1621 mg a day from the diet and supplements.  Calcium citrate is easier to digest.  Vitamin D 2000 IU daily recommended.    Risk of rebound vertebral fractures is higher when Prolia suddenly stopped or dose was missed.      Prolia and Covid vaccine should be  for at least a week.

## 2023-06-27 NOTE — PROGRESS NOTES
(M81.8) Idiopathic osteoporosis  (primary encounter diagnosis)  Comment: Patient was started on Prolia in 2016 when she saw Dr Mahoney. Oral bisphosphonate was not tried since she had chronic stomach pain.  DXA 6/2021:  1. The spine bone density L1-L4 with T-score -2.0.  2. Femoral bone densities show left femoral neck T- score -1.0 and right femoral neck T-score -1.1.  3. Trabecular bone score indicates good trabecular bone architecture.  Plan: DX Hip/Pelvis/Spine, Calcium timed urine            (E03.9) Acquired hypothyroidism  Comment: On levothyroxine. She will check TSH with her PCP.  Plan: TSH should be if possible 2-3.   Excessive thyroid hormone causes osteoporosis and fractures. This can occur when hyperthyroid disease goes untreated or patient is taking too much levothyroxine. One study of women with osteoporosis concluded that taking a high dose of thyroid hormone (>150 micrograms/day) increased their fracture risk more than a 50%.       (Z87.442) History of kidney stones  Comment: patient reports h/o kidney stones. I was not able to find 24 h urine calcium result. She will bring 24h urine. Printed info about daily calcium intake is provided. She will keep calcium intake 1000 mg daily from the food and supplements. Printed info about Prolia side effects and hypocalcemia provided. She will do the blood work with her PCP.  Plan: Calcium timed urine             Patient was educated on safety of Prolia utilizing Patient Counseling Chart for Healthcare Providers, as outlined by the Prolia REMS progam.     Return in about 6 months (around 12/27/2023) for Prolia with CSS.    Patient Instructions   Prolia 15th today.  Prolia 16th in 6 months with my nurse. I will see you in 1 year.    DXA due now .   Phone number to schedule [phone].    Daily calcium need is 7433-0425 mg a day from the diet and supplements.  Calcium citrate is easier to digest.  Vitamin D 2000 IU daily recommended.    Risk of rebound  "vertebral fractures is higher when Prolia suddenly stopped or dose was missed.      Prolia and Covid vaccine should be  for at least a week.           /60   Pulse 75   Temp 97.6  F (36.4  C)   Resp 16   Ht 1.638 m (5' 4.5\")   Wt 63.6 kg (140 lb 4.8 oz)   SpO2 98%   BMI 23.71 kg/m        Did you experience any problems with previous Prolia injection? no  Any medication change in the last 6 months? no  Did you take prednisone or other immunosupressant drugs in the last 6 months   (chemo, transplant, rheum, dermatology conditions)? no  Did you have any serious infection in the last 6 months?no  Any recent hospitalizations?no  Do you plan any dental work in the next 2-3 months?no  How much calcium do you take daily from the diet and supplements?1200 mg  How much vit D do you take daily? 2000 IU  Last DXA? 6/2021, Reviewed and discussed      Patient is here today for the  Prolia injection. Patient tolerated previous injections well.   We discussed calcium and vit D daily needs today.       We discussed high risk of rebound vertebral fractures when Prolia suddenly stopped.    Next Prolia injection will be in 6 months.           This note has been dictated using voice recognition software. Any grammatical or context distortions are unintentional and inherent to the software      Patient Active Problem List   Diagnosis     Hypothyroidism     Idiopathic osteoporosis     Hyperlipidemia     Female stress incontinence     History of kidney stones       Current Outpatient Medications   Medication     Ascorbic Acid (VITAMIN C) 500 MG CAPS     calcium carb and citrat-mag ox 200 mg calcium- 50 mg Tab     cetirizine (ZYRTEC) 10 MG tablet     cholecalciferol, vitamin D3, 5,000 unit Tab     fexofenadine-pseudoePHEDrine (ALLEGRA-D 24) 180-240 MG 24 hr tablet     levothyroxine (SYNTHROID/LEVOTHROID) 75 MCG tablet     multivitamin with minerals (THERA-M) 9 mg iron-400 mcg Tab tablet     rosuvastatin (CRESTOR) 20 MG " tablet     Current Facility-Administered Medications   Medication     denosumab (PROLIA) injection 60 mg

## 2023-07-12 ENCOUNTER — TRANSFERRED RECORDS (OUTPATIENT)
Dept: HEALTH INFORMATION MANAGEMENT | Facility: CLINIC | Age: 68
End: 2023-07-12
Payer: COMMERCIAL

## 2023-07-14 LAB
CALCIUM 24H UR-MRATE: 0.09 G/SPEC (ref 0.1–0.3)
CALCIUM UR-MCNC: 6.2 MG/DL
COLLECT DURATION TIME UR: 24 H
SPECIMEN VOL UR: 1425 ML

## 2023-07-14 PROCEDURE — 82340 ASSAY OF CALCIUM IN URINE: CPT | Performed by: INTERNAL MEDICINE

## 2023-07-14 PROCEDURE — 81050 URINALYSIS VOLUME MEASURE: CPT | Performed by: INTERNAL MEDICINE

## 2023-08-17 ENCOUNTER — HOSPITAL ENCOUNTER (OUTPATIENT)
Dept: BONE DENSITY | Facility: HOSPITAL | Age: 68
Discharge: HOME | End: 2023-08-17
Attending: INTERNAL MEDICINE | Admitting: INTERNAL MEDICINE
Payer: COMMERCIAL

## 2023-08-17 DIAGNOSIS — M81.8 IDIOPATHIC OSTEOPOROSIS: ICD-10-CM

## 2023-08-17 PROCEDURE — 77080 DXA BONE DENSITY AXIAL: CPT

## 2023-12-23 ENCOUNTER — HEALTH MAINTENANCE LETTER (OUTPATIENT)
Age: 68
End: 2023-12-23

## 2023-12-27 ENCOUNTER — ALLIED HEALTH/NURSE VISIT (OUTPATIENT)
Dept: FAMILY MEDICINE | Facility: CLINIC | Age: 68
End: 2023-12-27
Payer: COMMERCIAL

## 2023-12-27 DIAGNOSIS — M81.8 IDIOPATHIC OSTEOPOROSIS: Primary | ICD-10-CM

## 2023-12-27 PROCEDURE — 96372 THER/PROPH/DIAG INJ SC/IM: CPT | Performed by: INTERNAL MEDICINE

## 2023-12-27 PROCEDURE — 99207 PR NO CHARGE NURSE ONLY: CPT

## 2023-12-27 NOTE — PROGRESS NOTES
Indication: Prolia  (denosumab) is a prescription medicine used to treat osteoporosis in patients who:   Are at high risk for fracture, meaning patients who have had a fracture related to osteoporosis, or who have multiple risk factors for fracture   Cannot use another osteoporosis medicine or other osteoporosis medicines did not work well   The timeline for early/late injections would be 4 weeks early and any time after the 6 month edwardo. If a patient receives their injection late, then the subsequent injection would be 6 months from the date that they actually received the injection    1.  When was the last injection?  6/27/23  2.  Did they check with their insurance for this calendar year?  yes  3.  Is there an order in the chart?  yes  4.  Has the patient had dental work involving the bone in the past month or will have work in the next 6 months?  no  5.  Did you have the patient wait 15 minutes after the injection?  yes  6.  Remember to use .injection under the medication notes    The following steps were completed to comply with the REMS program for Prolia:  Reviewed information in the Medication Guide and Patient Counseling Chart, including the serious risks of Prolia  and the symptoms of each risk.  Advised patient to seek prompt medical attention if they have signs or symptoms of any of the serious risks.  Provided each patient a copy of the Medication Guide and Patient Brochure.     Clinic Administered Medication Documentation      Prolia Documentation    Indication: Prolia  (denosumab) is a prescription medicine used to treat osteoporosis in patients who:   Are at high risk for fracture, meaning patients who have had a fracture related to osteoporosis, or who have multiple risk factors for fracture.  Cannot use another osteoporosis medicine or other osteoporosis medicines did not work well.  The timeline for early/late injections would be 4 weeks early and any time after the 6 month edwardo. If a patient  receives their injection late, then the subsequent injection would be 6 months from the date that they actually received the injection.    When was the last injection?  23  Was the last injection at least 6 months ago? Yes  Has the prior authorization been completed?  Yes  Is there an active order (written within the past 365 days, with administrations remaining, not ) in the chart?  Yes  Patient denies any dental work involving the bone (e.g. tooth extraction or dental implants) in the past 4 weeks?  Yes  Patient denies plans for any dental work involving the bone (e.g. tooth extraction or dental implants) in the next 4 weeks? Yes    The following steps were completed to comply with the REMS program for Prolia:  Reviewed information in the Medication Guide and Patient Counseling Chart, including the serious risks of Prolia  and the symptoms of each risk.  Advised patient to seek prompt medical attention if they have signs or symptoms of any of the serious risks.  Provided each patient a copy of the Medication Guide and Patient Brochure.    Prior to injection, verified patient identity using patient's name and date of birth. Medication was administered. Please see MAR and medication order for additional information. Patient instructed to remain in clinic for 15 minutes and report any adverse reaction to staff immediately.    Vial/Syringe: Syringe  Was this medication supplied by the patient? No  Patient has no refills remaining. Refill encounter opened, order pended and Routed to the provider

## 2024-06-06 DIAGNOSIS — M81.8 IDIOPATHIC OSTEOPOROSIS: ICD-10-CM

## 2024-06-06 DIAGNOSIS — M81.0 AGE-RELATED OSTEOPOROSIS WITHOUT CURRENT PATHOLOGICAL FRACTURE: Primary | ICD-10-CM

## 2024-06-06 DIAGNOSIS — Z92.29 PERSONAL HISTORY OF OTHER DRUG THERAPY: ICD-10-CM

## 2024-06-28 ENCOUNTER — ANCILLARY PROCEDURE (OUTPATIENT)
Dept: MAMMOGRAPHY | Facility: CLINIC | Age: 69
End: 2024-06-28
Attending: FAMILY MEDICINE
Payer: COMMERCIAL

## 2024-06-28 ENCOUNTER — OFFICE VISIT (OUTPATIENT)
Dept: INTERNAL MEDICINE | Facility: CLINIC | Age: 69
End: 2024-06-28
Payer: COMMERCIAL

## 2024-06-28 VITALS
DIASTOLIC BLOOD PRESSURE: 70 MMHG | TEMPERATURE: 97.9 F | BODY MASS INDEX: 22.64 KG/M2 | HEART RATE: 73 BPM | SYSTOLIC BLOOD PRESSURE: 116 MMHG | HEIGHT: 65 IN | WEIGHT: 135.9 LBS | OXYGEN SATURATION: 97 % | RESPIRATION RATE: 16 BRPM

## 2024-06-28 DIAGNOSIS — E03.9 ACQUIRED HYPOTHYROIDISM: ICD-10-CM

## 2024-06-28 DIAGNOSIS — Z12.31 VISIT FOR SCREENING MAMMOGRAM: ICD-10-CM

## 2024-06-28 DIAGNOSIS — M81.8 IDIOPATHIC OSTEOPOROSIS: Primary | ICD-10-CM

## 2024-06-28 DIAGNOSIS — Z87.442 HISTORY OF KIDNEY STONES: ICD-10-CM

## 2024-06-28 PROCEDURE — 96372 THER/PROPH/DIAG INJ SC/IM: CPT | Mod: LT | Performed by: INTERNAL MEDICINE

## 2024-06-28 PROCEDURE — 77063 BREAST TOMOSYNTHESIS BI: CPT

## 2024-06-28 PROCEDURE — 99214 OFFICE O/P EST MOD 30 MIN: CPT | Mod: 25 | Performed by: INTERNAL MEDICINE

## 2024-06-28 NOTE — PATIENT INSTRUCTIONS
Prolia 17th today.  Prolia 18th in 6 months with my nurse. I will see you in 1 year.    DXA in 8/2025 .   Phone number to schedule [phone].    Daily calcium need is 1189-5144 mg a day from the diet and supplements.  Calcium citrate is easier to digest.  Vitamin D 2000 IU daily recommended.    Risk of rebound vertebral fractures is higher when Prolia suddenly stopped or dose was missed.      Prolia and Covid vaccine should be  for at least a week.     2/2/24 - Ca 9.8, Cr 0.97, TSH 2.38, vit D 26

## 2024-06-28 NOTE — PROGRESS NOTES
(M81.8) Idiopathic osteoporosis  (primary encounter diagnosis)  Comment:  Patient was started on Prolia in 2016 when she saw Dr Mahoney. Oral bisphosphonate was not tried since she had chronic stomach pain.     DXA 8/2023 reviewed with the patient:  -Lumbar Spine: L2-L4, (L3): BMD: 0.967 g/cm2. T-score: -1.9. Z-score: -0.3. L1 and L3 excluded due to degenerative changes.  -RIGHT Hip Total: BMD: 0.987 g/cm2. T-score: -0.2. Z-score: 1.2.  -RIGHT Hip Femoral neck: BMD: 0.917 g/cm2. T-score: -0.9. Z-score: 0.7.  -LEFT Hip Total: BMD: 0.990 g/cm2. T-score: -0.1. Z-score: 1.2.  -LEFT Hip Femoral neck: BMD: 0.937 g/cm2. T-score: -0.7. Z-score: 0.9.  -There has been a 4.3% increase in lumbar spine BMD.  -There has been a 2.7% increase in bilateral hip BMD.    Labs from [facility]:  2/2/24 - Ca 9.8, Cr 0.97, TSH 2.38, vit D 26    Patient education - all discussed with the patient during this visit:  Patients advised to maintain good oral hygiene during treatment, because of the risk for osteonecrosis of the jaw.   The risk of osteonecrosis of the jaw with Prolia therapy is extremely low.   If a patient is late for Prolia therapy (>3 wks) a rapid rebound of bone loss can occur which is highly concerning and important to try to prevent to optimize bone health.   Therefore if an invasive dental procedure is required, primarily extraction or implant, while on Prolia therapy, the recommendation is to time the dental procedure optimally 4 months after the most recent dose of Prolia allowing 6-8 weeks for healing prior to next dose of Prolia.   This is based on the updated 2022 Recommendations from the American Association of Oral and Maxillofacial Surgeons.   We also recommend discussing with dentist or oral surgeon if pretreatment prophylactic oral rinses and antibiotics would be beneficial.   Optimizing oral hygiene before any invasive dental procedure significantly lowers ONJ risk.     There is a greater risk of severe  hypocalcemia following denosumab administration and patient is advised that we will have to monitor calcium, phosphorous, and magnesium levels. Risk of infection is increased with denosumab use, so patient will inform me if has more frequent infections.     Atypical femur fracture  has been reported in patients receiving denosumab. The fractures may occur anywhere along the femoral shaft (may be bilateral) and commonly occur with minimal to no trauma to the area. Some patients experience prodromal thigh or groin pain weeks or months before the fracture occurs. Contralateral limb should be assessed if AFF occurs.     Multiple vertebral column fracture has been reported following discontinuation of therapy. Hypertension and increased cholesterol were reported with denosumab use.      Discussed 10-year data of Prolia. Discussed the importance of being on time and consistent with Prolia use to prevent rapid bone of osteoclastic activity.  Discussed that if Prolia is discontinued we will likely need to utilize an antiresorptive, such as Reclast, to help prevent rapid rebound of osteoclast activity. Often more than 1 dose is required.  Labs yearly to ensure calcium and vitamin D optimized while patient on Prolia.     (E03.9) Acquired hypothyroidism  Comment: TSH was 2.38 in Feb this year. She is on levothyroxine 75 mcg  Excessive thyroid hormone causes osteoporosis and fractures. This can occur when hyperthyroid disease goes untreated or patient is taking too much levothyroxine. One study of women with osteoporosis concluded that taking a high dose of thyroid hormone (>150 micrograms/day) increased their fracture risk more than a 50%.        (Z87.442) History of kidney stones  Comment: patient reports h/o kidney stones.    Component  Ref Range & Units 11 mo ago     Calcium Urine mg/dL  mg/dL 6.2   Comment: The reference ranges have not been established in urine calcium. The results should be integrated into the clinical  "context for interpretation.    Duration in hours  h 24.0    Volume in mL  mL 1,425    Calcium Urine g/spec  0.10 - 0.30 g/spec 0.09 Low      She will keep calcium intake 1000 mg daily from the food and supplements.       Patient was educated on safety of Prolia utilizing Patient Counseling Chart for Healthcare Providers, as outlined by the Prolia REMS progam.   The longitudinal plan of care for the diagnosis(es)/condition(s) as documented were addressed during this visit. Due to the added complexity in care, I will continue to support Nancy in the subsequent management and with ongoing continuity of care.    Return in about 6 months (around 12/28/2024) for Prolia with CSS.    Patient Instructions   Prolia 17th today.  Prolia 18th in 6 months with my nurse. I will see you in 1 year.    DXA in 8/2025 .   Phone number to schedule [phone].    Daily calcium need is 5289-8479 mg a day from the diet and supplements.  Calcium citrate is easier to digest.  Vitamin D 2000 IU daily recommended.    Risk of rebound vertebral fractures is higher when Prolia suddenly stopped or dose was missed.      Prolia and Covid vaccine should be  for at least a week.     2/2/24 - Ca 9.8, Cr 0.97, TSH 2.38, vit D 26          /70   Pulse 73   Temp 97.9  F (36.6  C)   Resp 16   Ht 1.638 m (5' 4.5\")   Wt 61.6 kg (135 lb 14.4 oz)   LMP  (LMP Unknown)   SpO2 97%   BMI 22.97 kg/m        Did you experience any problems with previous Prolia injection? no  Any medication change in the last 6 months? no  Did you take prednisone or other immunosupressant drugs in the last 6 months   (chemo, transplant, rheum, dermatology conditions)? no  Did you have any serious infection in the last 6 months?no  Any recent hospitalizations?no  Do you plan any dental work in the next 2-3 months?no  How much calcium do you take daily from the diet and supplements?1200 mg  How much vit D do you take daily? 2000 IU  Last DXA? 8/2023, Reviewed " and discussed      Patient is here today for the Prolia injection. Patient tolerated previous injections well.   We discussed calcium and vit D daily needs today.       We discussed high risk of rebound vertebral fractures when Prolia suddenly stopped.    Next Prolia injection will be in 6 months.           This note has been dictated using voice recognition software. Any grammatical or context distortions are unintentional and inherent to the software      Patient Active Problem List   Diagnosis    Hypothyroidism    Idiopathic osteoporosis    Hyperlipidemia    Female stress incontinence    History of kidney stones       Current Outpatient Medications   Medication Sig Dispense Refill    Ascorbic Acid (VITAMIN C) 500 MG CAPS       calcium carb and citrat-mag ox 200 mg calcium- 50 mg Tab [CALCIUM CARB AND CITRAT-MAG  MG CALCIUM- 50 MG TAB] Take by mouth.      cetirizine (ZYRTEC) 10 MG tablet Take 1 tablet (10 mg) by mouth daily Occassionally      cholecalciferol, vitamin D3, 5,000 unit Tab Take 1 tablet (125 mcg) by mouth daily 5000 one day a week and 1000 the rest of the days.      fexofenadine-pseudoePHEDrine (ALLEGRA-D 24) 180-240 MG 24 hr tablet Take 1 tablet by mouth daily      levothyroxine (SYNTHROID/LEVOTHROID) 75 MCG tablet TAKE 1 TABLET(75 MCG) BY MOUTH DAILY 90 tablet 4    multivitamin with minerals (THERA-M) 9 mg iron-400 mcg Tab tablet [MULTIVITAMIN WITH MINERALS (THERA-M) 9 MG IRON-400 MCG TAB TABLET] Take 1 tablet by mouth daily.      rosuvastatin (CRESTOR) 20 MG tablet Take 1 tablet (20 mg) by mouth At Bedtime 90 tablet 4     Current Facility-Administered Medications   Medication Dose Route Frequency Provider Last Rate Last Admin    denosumab (PROLIA) injection 60 mg  60 mg Subcutaneous Q6 Months

## 2024-11-07 ENCOUNTER — TELEPHONE (OUTPATIENT)
Dept: INTERNAL MEDICINE | Facility: CLINIC | Age: 69
End: 2024-11-07
Payer: COMMERCIAL

## 2024-11-07 DIAGNOSIS — M81.0 AGE-RELATED OSTEOPOROSIS WITHOUT CURRENT PATHOLOGICAL FRACTURE: Primary | ICD-10-CM

## 2024-11-07 NOTE — TELEPHONE ENCOUNTER
Patient needs bmp drawn prior to prolia injection. Order pended.      If patient is unable to come in prior to 12/23/24 injection it will need to be rescheduled.     Once order is signed- please route to nurse pool so we can call and get appointment scheduled.      Joslyn DOMINGO RN

## 2024-12-17 ENCOUNTER — LAB (OUTPATIENT)
Dept: LAB | Facility: CLINIC | Age: 69
End: 2024-12-17
Payer: COMMERCIAL

## 2024-12-17 DIAGNOSIS — M81.0 AGE-RELATED OSTEOPOROSIS WITHOUT CURRENT PATHOLOGICAL FRACTURE: ICD-10-CM

## 2024-12-17 LAB
ANION GAP SERPL CALCULATED.3IONS-SCNC: 9 MMOL/L (ref 7–15)
BUN SERPL-MCNC: 16.8 MG/DL (ref 8–23)
CALCIUM SERPL-MCNC: 8.9 MG/DL (ref 8.8–10.4)
CHLORIDE SERPL-SCNC: 106 MMOL/L (ref 98–107)
CREAT SERPL-MCNC: 0.82 MG/DL (ref 0.51–0.95)
EGFRCR SERPLBLD CKD-EPI 2021: 77 ML/MIN/1.73M2
GLUCOSE SERPL-MCNC: 94 MG/DL (ref 70–99)
HCO3 SERPL-SCNC: 25 MMOL/L (ref 22–29)
POTASSIUM SERPL-SCNC: 4.2 MMOL/L (ref 3.4–5.3)
SODIUM SERPL-SCNC: 140 MMOL/L (ref 135–145)

## 2024-12-17 PROCEDURE — 36415 COLL VENOUS BLD VENIPUNCTURE: CPT

## 2024-12-17 PROCEDURE — 80048 BASIC METABOLIC PNL TOTAL CA: CPT

## 2024-12-19 ENCOUNTER — TELEPHONE (OUTPATIENT)
Dept: INTERNAL MEDICINE | Facility: CLINIC | Age: 69
End: 2024-12-19
Payer: COMMERCIAL

## 2024-12-19 DIAGNOSIS — Z92.29 PERSONAL HISTORY OF OTHER DRUG THERAPY: ICD-10-CM

## 2024-12-19 DIAGNOSIS — M81.8 IDIOPATHIC OSTEOPOROSIS: Primary | ICD-10-CM

## 2024-12-19 NOTE — TELEPHONE ENCOUNTER
"12/19/2024    Pt called back stating she would like to talk to KALYANI Jorgensen again about her prolia shot. Pt states she needs Dr Michael to write her a letter stating \"in my opinion\" Pt needs to get the prolia injection as she is not ready for the oral medication yet.  Pt states she will pay for the prolia injection out of pocket but if she has a letter from Dr Michael she can get reimbursed from her insurance.    Donna Reyna      "

## 2024-12-19 NOTE — TELEPHONE ENCOUNTER
2PM: Talked with Dr. Michael regarding this message from CAM team regarding prolia- patient will need a visit with Dr. Michael to discuss.       2:08 pm: Outgoing call to patient. Had a 15 minute phone call regarding what is going on. Patient asking to get the shot without insurance coverage. Patient would need to sign a form in clinic stating that this is okay.       2:53 pm-Talked with patient again. she talked with the insurance company. If Dr. Michael sends a letter that At this time it is best action to get the Prolia Shot,  insurance may consider covering the medication.       Patient will be gone for a few months and unable to come appointment with Dr. Michael. She said that she is going to come in and get the injection without the insurance approval at this time.    Patient kept repeating about  Dr. Michael not wanting to switch to oral medication or a different plan until after next DEXA scan.      OV 6/28/24  Patient Instructions   Prolia 17th today.  Prolia 18th in 6 months with my nurse. I will see you in 1 year.     DXA in 8/2025 .   Phone number to schedule [phone].     Daily calcium need is 1459-8606 mg a day from the diet and supplements.  Calcium citrate is easier to digest.  Vitamin D 2000 IU daily recommended.     Risk of rebound vertebral fractures is higher when Prolia suddenly stopped or dose was missed.

## 2024-12-23 ENCOUNTER — ALLIED HEALTH/NURSE VISIT (OUTPATIENT)
Dept: FAMILY MEDICINE | Facility: CLINIC | Age: 69
End: 2024-12-23
Payer: COMMERCIAL

## 2024-12-23 DIAGNOSIS — M81.8 IDIOPATHIC OSTEOPOROSIS: Primary | ICD-10-CM

## 2024-12-23 PROCEDURE — 99207 PR NO CHARGE NURSE ONLY: CPT

## 2024-12-23 PROCEDURE — 96372 THER/PROPH/DIAG INJ SC/IM: CPT | Performed by: INTERNAL MEDICINE

## 2024-12-23 NOTE — PROGRESS NOTES
Clinic Administered Medication Documentation      Prolia Documentation    Indication: Prolia  (denosumab) is a prescription medicine used to treat osteoporosis in patients who:   Are at high risk for fracture, meaning patients who have had a fracture related to osteoporosis, or who have multiple risk factors for fracture.  Cannot use another osteoporosis medicine or other osteoporosis medicines did not work well.  The timeline for early/late injections would be 4 weeks early and any time after the 6 month edwardo. If a patient receives their injection late, then the subsequent injection would be 6 months from the date that they actually received the injection.    When was the last injection?  24  Was the last injection at least 6 months ago? Yes  Has the prior authorization been completed?  Yes, but at this time it is not covered by insurance. Patient is aware that she will be paying out of pocket for the medication. Waiver and cost of care estimate signed by patient. Patient states she talked to her insurance company this morning and they have stated they will now cover the medication and that she will be reimbursed. Patient would like to move forward with injection.   Is there an active order (written within the past 365 days, with administrations remaining, not ) in the chart?  Yes   GFR Estimate   Date Value Ref Range Status   2024 77 >60 mL/min/1.73m2 Final     Comment:     eGFR calculated using  CKD-EPI equation.   10/12/2020 >60 >60 mL/min/1.73m2 Final     Has patient had a GFR within the last 12 months? Yes   Is GFR under 30, or patient has a diagnosis of CKD4 or CKD5? No   Patient denies gastric bypass or parathyroid surgery in past 6 months? Yes - patient denies.   Patient denies dental work in the past two months involving drilling into the bone, such as implants/extractions, oral surgery or a tooth extraction that has not healed yet?  Yes  Patient denies plans for an emergency tooth  extraction within the next week? Yes    The following steps were completed to comply with the REMS program for Prolia:  Reviewed information in the Medication Guide, including the serious risks of Prolia  and the symptoms of each risk.  Advised patient to seek prompt medical attention if they have signs or symptoms of any of the serious risks.  Provided each patient a copy of the Medication Guide and Patient Guide.    Prior to injection, verified patient identity using patient's name and date of birth. Medication was administered. Please see MAR and medication order for additional information. Patient instructed to remain in clinic for 15 minutes and report any adverse reaction to staff immediately.    Vial/Syringe: Syringe  Was this medication supplied by the patient? No  Verified that the patient has administrations remaining in their prescription.

## 2025-01-18 ENCOUNTER — HEALTH MAINTENANCE LETTER (OUTPATIENT)
Age: 70
End: 2025-01-18

## 2025-05-06 ENCOUNTER — HOSPITAL ENCOUNTER (OUTPATIENT)
Dept: BONE DENSITY | Facility: HOSPITAL | Age: 70
Discharge: HOME | End: 2025-05-06
Attending: INTERNAL MEDICINE | Admitting: INTERNAL MEDICINE
Payer: COMMERCIAL

## 2025-05-06 DIAGNOSIS — M81.8 IDIOPATHIC OSTEOPOROSIS: ICD-10-CM

## 2025-05-06 PROCEDURE — 77080 DXA BONE DENSITY AXIAL: CPT

## 2025-05-08 ENCOUNTER — RESULTS FOLLOW-UP (OUTPATIENT)
Dept: INTERNAL MEDICINE | Facility: CLINIC | Age: 70
End: 2025-05-08

## 2025-06-12 DIAGNOSIS — M81.0 AGE-RELATED OSTEOPOROSIS WITHOUT CURRENT PATHOLOGICAL FRACTURE: Primary | ICD-10-CM

## 2025-06-12 DIAGNOSIS — Z92.29 PERSONAL HISTORY OF OTHER DRUG THERAPY: ICD-10-CM

## 2025-06-25 ENCOUNTER — OFFICE VISIT (OUTPATIENT)
Dept: INTERNAL MEDICINE | Facility: CLINIC | Age: 70
End: 2025-06-25
Payer: COMMERCIAL

## 2025-06-25 ENCOUNTER — RESULTS FOLLOW-UP (OUTPATIENT)
Dept: INTERNAL MEDICINE | Facility: CLINIC | Age: 70
End: 2025-06-25

## 2025-06-25 VITALS
DIASTOLIC BLOOD PRESSURE: 82 MMHG | RESPIRATION RATE: 18 BRPM | SYSTOLIC BLOOD PRESSURE: 123 MMHG | OXYGEN SATURATION: 98 % | WEIGHT: 142.06 LBS | HEIGHT: 65 IN | TEMPERATURE: 98.1 F | HEART RATE: 80 BPM | BODY MASS INDEX: 23.67 KG/M2

## 2025-06-25 DIAGNOSIS — M81.8 IDIOPATHIC OSTEOPOROSIS: Primary | ICD-10-CM

## 2025-06-25 DIAGNOSIS — Z87.442 HISTORY OF KIDNEY STONES: ICD-10-CM

## 2025-06-25 DIAGNOSIS — E03.9 ACQUIRED HYPOTHYROIDISM: ICD-10-CM

## 2025-06-25 LAB
ANION GAP SERPL CALCULATED.3IONS-SCNC: 12 MMOL/L (ref 7–15)
BUN SERPL-MCNC: 23.1 MG/DL (ref 8–23)
CALCIUM SERPL-MCNC: 9.7 MG/DL (ref 8.8–10.4)
CHLORIDE SERPL-SCNC: 101 MMOL/L (ref 98–107)
CREAT SERPL-MCNC: 0.91 MG/DL (ref 0.51–0.95)
EGFRCR SERPLBLD CKD-EPI 2021: 68 ML/MIN/1.73M2
GLUCOSE SERPL-MCNC: 100 MG/DL (ref 70–99)
HCO3 SERPL-SCNC: 26 MMOL/L (ref 22–29)
POTASSIUM SERPL-SCNC: 4.6 MMOL/L (ref 3.4–5.3)
SODIUM SERPL-SCNC: 139 MMOL/L (ref 135–145)
TSH SERPL DL<=0.005 MIU/L-ACNC: 2.65 UIU/ML (ref 0.3–4.2)
VIT D+METAB SERPL-MCNC: 39 NG/ML (ref 20–50)

## 2025-06-25 PROCEDURE — 84443 ASSAY THYROID STIM HORMONE: CPT | Performed by: INTERNAL MEDICINE

## 2025-06-25 PROCEDURE — 99214 OFFICE O/P EST MOD 30 MIN: CPT | Mod: 25 | Performed by: INTERNAL MEDICINE

## 2025-06-25 PROCEDURE — 82306 VITAMIN D 25 HYDROXY: CPT | Performed by: INTERNAL MEDICINE

## 2025-06-25 PROCEDURE — 3079F DIAST BP 80-89 MM HG: CPT | Performed by: INTERNAL MEDICINE

## 2025-06-25 PROCEDURE — 36415 COLL VENOUS BLD VENIPUNCTURE: CPT | Performed by: INTERNAL MEDICINE

## 2025-06-25 PROCEDURE — 80048 BASIC METABOLIC PNL TOTAL CA: CPT | Performed by: INTERNAL MEDICINE

## 2025-06-25 PROCEDURE — 3074F SYST BP LT 130 MM HG: CPT | Performed by: INTERNAL MEDICINE

## 2025-06-25 PROCEDURE — 96372 THER/PROPH/DIAG INJ SC/IM: CPT | Performed by: INTERNAL MEDICINE

## 2025-06-25 NOTE — PATIENT INSTRUCTIONS
Prolia 19th today. Labs today.  Prolia 20th in 6 months with my nurse. I will see you in 1 year.    DXA in 5/2027 .   Phone number to schedule [phone].    Daily calcium need is 9771-2534 mg a day from the diet and supplements.  Calcium citrate is easier to digest.  Vitamin D 1000 IU daily recommended.    Risk of rebound vertebral fractures is higher when Prolia suddenly stopped or dose was missed.      Prolia and Covid vaccine should be  for at least a week.    Patient education:  Patients advised to maintain good oral hygiene during treatment, because of the risk for osteonecrosis of the jaw.   The risk of osteonecrosis of the jaw with Prolia therapy is extremely low.   If a patient is late for Prolia therapy (>3 wks) a rapid rebound of bone loss can occur which is highly concerning and important to try to prevent to optimize bone health.   Therefore if an invasive dental procedure is required, primarily extraction or implant, while on Prolia therapy, the recommendation is to time the dental procedure optimally 4 months after the most recent dose of Prolia allowing 6-8 weeks for healing prior to next dose of Prolia.   This is based on the updated 2022 Recommendations from the American Association of Oral and Maxillofacial Surgeons.   We also recommend discussing with dentist or oral surgeon if pretreatment prophylactic oral rinses and antibiotics would be beneficial.   Optimizing oral hygiene before any invasive dental procedure significantly lowers ONJ risk.     There is a greater risk of severe hypocalcemia following denosumab administration and patient is advised that we will have to monitor calcium level.  Risk of infection is increased with denosumab use, so patient will inform me if has more frequent infections.     Atypical femur fracture  has been reported in patients receiving denosumab. The fractures may occur anywhere along the femoral shaft (may be bilateral) and commonly occur with  minimal to no trauma to the area. Some patients experience prodromal thigh or groin pain weeks or months before the fracture occurs. Contralateral limb should be assessed if AFF occurs.     Multiple vertebral column fracture has been reported following discontinuation of therapy. Hypertension and increased cholesterol were reported with denosumab use.      Discussed 10-year data of Prolia. Discussed the importance of being on time and consistent with Prolia use to prevent rapid bone of osteoclastic activity.  Discussed that if Prolia is discontinued we will likely need to utilize an antiresorptive, such as Reclast, to help prevent rapid rebound of osteoclast activity. Often more than 1 dose is required.  Labs yearly to ensure calcium and vitamin D optimized while patient on Prolia.

## 2025-06-26 NOTE — PROGRESS NOTES
Patient is here today for the Prolia injection. Patient tolerated previous injections well. No recent falls, new fractures, medication change, hospitalizations or surgeries. We discussed calcium and vit D daily needs today.   We discussed high risk of rebound vertebral fractures when Prolia suddenly stopped.      (M81.8) Idiopathic osteoporosis  (primary encounter diagnosis)  Patient was started on Prolia in 2016 when she saw Dr Mahoney. Oral bisphosphonate was not tried since she had chronic stomach pain.     DXA done 5/2025 was reviewed and discussed with the patient:  -Lumbar Spine: L1-L4 (L3) : BMD: 0.930 g/cm2. T-score: -2.0. Z-score: -0.3.  -RIGHT Hip Total: BMD: 1.015 g/cm2. T-score: 0.1. Z-score: 1.5.  -RIGHT Hip Femoral neck: BMD: 0.926 g/cm2. T-score: -0.8. Z-score: 0.9.  -LEFT Hip Total: BMD: 1.007 g/cm2. T-score: 0.0. Z-score: 1.4.  -LEFT Hip Femoral neck: BMD: 0.940 g/cm2. T-score: -0.7. Z-score: 1.0.    INTERVAL CHANGE  -There has been a 2.5 % decrease in lumbar spine BMD.  -There has been a 2.2 % increase in bilateral hip BMD.      Component      Latest Ref Rng 6/25/2025  10:58 AM   Sodium      135 - 145 mmol/L 139    Potassium      3.4 - 5.3 mmol/L 4.6    Chloride      98 - 107 mmol/L 101    Carbon Dioxide (CO2)      22 - 29 mmol/L 26    Anion Gap      7 - 15 mmol/L 12    Urea Nitrogen      8.0 - 23.0 mg/dL 23.1 (H)    Creatinine      0.51 - 0.95 mg/dL 0.91    GFR Estimate      >60 mL/min/1.73m2 68    Calcium      8.8 - 10.4 mg/dL 9.7    Glucose      70 - 99 mg/dL 100 (H)    TSH      0.30 - 4.20 uIU/mL 2.65    Vitamin D, Total (25-Hydroxy)      20 - 50 ng/mL 39         Plan: Basic metabolic panel, Vitamin D Deficiency            (E03.9) Acquired hypothyroidism  Comment: TSH 2.65 today. She will continue same levothyroxine dose.  Plan: TSH WITH FREE T4 REFLEX            (Z87.442) History of kidney stones  Comment: patient reports h/o kidney stones.   Plan: She will keep calcium intake 1000 mg daily  from the food and supplements     The longitudinal plan of care for the diagnosis(es)/condition(s) as documented were addressed during this visit. Due to the added complexity in care, I will continue to support Nancy in the subsequent management and with ongoing continuity of care.    Patient was educated on safety of Prolia utilizing Patient Counseling Chart for Healthcare Providers, as outlined by the Prolia REMS progam.     Return in about 6 months (around 12/25/2025) for Prolia with CSS.    Patient Instructions   Prolia 19th today. Labs today.  Prolia 20th in 6 months with my nurse. I will see you in 1 year.    DXA in 5/2027 .   Phone number to schedule [phone].    Daily calcium need is 6438-0974 mg a day from the diet and supplements.  Calcium citrate is easier to digest.  Vitamin D 1000 IU daily recommended.    Risk of rebound vertebral fractures is higher when Prolia suddenly stopped or dose was missed.      Prolia and Covid vaccine should be  for at least a week.    Patient education:  Patients advised to maintain good oral hygiene during treatment, because of the risk for osteonecrosis of the jaw.   The risk of osteonecrosis of the jaw with Prolia therapy is extremely low.   If a patient is late for Prolia therapy (>3 wks) a rapid rebound of bone loss can occur which is highly concerning and important to try to prevent to optimize bone health.   Therefore if an invasive dental procedure is required, primarily extraction or implant, while on Prolia therapy, the recommendation is to time the dental procedure optimally 4 months after the most recent dose of Prolia allowing 6-8 weeks for healing prior to next dose of Prolia.   This is based on the updated 2022 Recommendations from the [employer] Oral [employer].   We also recommend discussing with dentist or oral surgeon if pretreatment prophylactic oral rinses and antibiotics would be beneficial.   Optimizing oral  "hygiene before any invasive dental procedure significantly lowers ONJ risk.     There is a greater risk of severe hypocalcemia following denosumab administration and patient is advised that we will have to monitor calcium level.  Risk of infection is increased with denosumab use, so patient will inform me if has more frequent infections.     Atypical femur fracture  has been reported in patients receiving denosumab. The fractures may occur anywhere along the femoral shaft (may be bilateral) and commonly occur with minimal to no trauma to the area. Some patients experience prodromal thigh or groin pain weeks or months before the fracture occurs. Contralateral limb should be assessed if AFF occurs.     Multiple vertebral column fracture has been reported following discontinuation of therapy. Hypertension and increased cholesterol were reported with denosumab use.      Discussed 10-year data of Prolia. Discussed the importance of being on time and consistent with Prolia use to prevent rapid bone of osteoclastic activity.  Discussed that if Prolia is discontinued we will likely need to utilize an antiresorptive, such as Reclast, to help prevent rapid rebound of osteoclast activity. Often more than 1 dose is required.  Labs yearly to ensure calcium and vitamin D optimized while patient on Prolia.            /82 (BP Location: Right arm, Patient Position: Sitting, Cuff Size: Adult Regular)   Pulse 80   Temp 98.1  F (36.7  C) (Oral)   Resp 18   Ht 1.638 m (5' 4.5\")   Wt 64.4 kg (142 lb 1 oz)   LMP  (LMP Unknown)   SpO2 98%   BMI 24.01 kg/m        Did you experience any problems with previous Prolia injection? no  Any medication change in the last 6 months? no  Did you take prednisone or other immunosupressant drugs in the last 6 months   (chemo, transplant, rheum, dermatology conditions)? no  Did you have any serious infection in the last 6 months?no  Any recent hospitalizations?no  Do you plan any dental work " in the next 2-3 months?no  How much calcium do you take daily from the diet and supplements?1200 mg  How much vit D do you take daily? 2000 IU  Last DXA? 5/2025 Reviewed and discussed            This note has been dictated using voice recognition software. Any grammatical or context distortions are unintentional and inherent to the software      Patient Active Problem List   Diagnosis    Hypothyroidism    Idiopathic osteoporosis    Hyperlipidemia    Female stress incontinence    History of kidney stones       Current Outpatient Medications   Medication Sig Dispense Refill    Ascorbic Acid (VITAMIN C) 500 MG CAPS       calcium carb and citrat-mag ox 200 mg calcium- 50 mg Tab [CALCIUM CARB AND CITRAT-MAG  MG CALCIUM- 50 MG TAB] Take by mouth.      cetirizine (ZYRTEC) 10 MG tablet Take 1 tablet (10 mg) by mouth daily Occassionally      cholecalciferol, vitamin D3, 5,000 unit Tab Take 1 tablet (125 mcg) by mouth daily 5000 one day a week and 1000 the rest of the days.      fexofenadine-pseudoePHEDrine (ALLEGRA-D 24) 180-240 MG 24 hr tablet Take 1 tablet by mouth daily      levothyroxine (SYNTHROID/LEVOTHROID) 75 MCG tablet TAKE 1 TABLET(75 MCG) BY MOUTH DAILY 90 tablet 4    multivitamin with minerals (THERA-M) 9 mg iron-400 mcg Tab tablet [MULTIVITAMIN WITH MINERALS (THERA-M) 9 MG IRON-400 MCG TAB TABLET] Take 1 tablet by mouth daily.      rosuvastatin (CRESTOR) 20 MG tablet Take 1 tablet (20 mg) by mouth At Bedtime 90 tablet 4     Current Facility-Administered Medications   Medication Dose Route Frequency Provider Last Rate Last Admin    denosumab (PROLIA) injection 60 mg  60 mg Subcutaneous Q6 Months    60 mg at 06/25/25 1038    denosumab (PROLIA) injection 60 mg  60 mg Subcutaneous Q6 Months    60 mg at 12/23/24 0958

## 2025-08-11 ENCOUNTER — ANCILLARY PROCEDURE (OUTPATIENT)
Dept: MAMMOGRAPHY | Facility: CLINIC | Age: 70
End: 2025-08-11
Attending: FAMILY MEDICINE
Payer: COMMERCIAL

## 2025-08-11 DIAGNOSIS — Z12.31 SCREENING MAMMOGRAM, ENCOUNTER FOR: ICD-10-CM

## 2025-08-11 PROCEDURE — 77063 BREAST TOMOSYNTHESIS BI: CPT
